# Patient Record
Sex: MALE | Race: WHITE | NOT HISPANIC OR LATINO | URBAN - METROPOLITAN AREA
[De-identification: names, ages, dates, MRNs, and addresses within clinical notes are randomized per-mention and may not be internally consistent; named-entity substitution may affect disease eponyms.]

---

## 2017-03-29 ENCOUNTER — OUTPATIENT (OUTPATIENT)
Dept: OUTPATIENT SERVICES | Facility: HOSPITAL | Age: 47
LOS: 1 days | End: 2017-03-29
Payer: COMMERCIAL

## 2017-03-29 DIAGNOSIS — M54.16 RADICULOPATHY, LUMBAR REGION: ICD-10-CM

## 2017-03-29 PROCEDURE — 77003 FLUOROGUIDE FOR SPINE INJECT: CPT

## 2017-03-29 PROCEDURE — 62323 NJX INTERLAMINAR LMBR/SAC: CPT

## 2017-04-06 ENCOUNTER — TRANSCRIPTION ENCOUNTER (OUTPATIENT)
Age: 47
End: 2017-04-06

## 2017-08-02 ENCOUNTER — OUTPATIENT (OUTPATIENT)
Dept: OUTPATIENT SERVICES | Facility: HOSPITAL | Age: 47
LOS: 1 days | End: 2017-08-02
Payer: COMMERCIAL

## 2017-08-02 DIAGNOSIS — M54.16 RADICULOPATHY, LUMBAR REGION: ICD-10-CM

## 2017-08-02 PROCEDURE — 77003 FLUOROGUIDE FOR SPINE INJECT: CPT

## 2017-08-02 PROCEDURE — 62323 NJX INTERLAMINAR LMBR/SAC: CPT

## 2017-10-15 ENCOUNTER — TRANSCRIPTION ENCOUNTER (OUTPATIENT)
Age: 47
End: 2017-10-15

## 2018-02-07 ENCOUNTER — APPOINTMENT (OUTPATIENT)
Dept: HEMATOLOGY ONCOLOGY | Facility: CLINIC | Age: 48
End: 2018-02-07
Payer: COMMERCIAL

## 2018-02-07 VITALS
TEMPERATURE: 98.5 F | DIASTOLIC BLOOD PRESSURE: 60 MMHG | HEIGHT: 68 IN | WEIGHT: 222 LBS | BODY MASS INDEX: 33.65 KG/M2 | SYSTOLIC BLOOD PRESSURE: 120 MMHG | HEART RATE: 72 BPM | RESPIRATION RATE: 16 BRPM

## 2018-02-07 DIAGNOSIS — E78.00 PURE HYPERCHOLESTEROLEMIA, UNSPECIFIED: ICD-10-CM

## 2018-02-07 DIAGNOSIS — F17.200 NICOTINE DEPENDENCE, UNSPECIFIED, UNCOMPLICATED: ICD-10-CM

## 2018-02-07 DIAGNOSIS — Z87.19 PERSONAL HISTORY OF OTHER DISEASES OF THE DIGESTIVE SYSTEM: ICD-10-CM

## 2018-02-07 DIAGNOSIS — Z78.9 OTHER SPECIFIED HEALTH STATUS: ICD-10-CM

## 2018-02-07 DIAGNOSIS — D75.1 SECONDARY POLYCYTHEMIA: ICD-10-CM

## 2018-02-07 PROCEDURE — 99205 OFFICE O/P NEW HI 60 MIN: CPT

## 2018-02-07 RX ORDER — PIROXICAM 20 MG/1
20 CAPSULE ORAL
Qty: 30 | Refills: 0 | Status: ACTIVE | COMMUNITY
Start: 2018-01-16

## 2018-02-07 RX ORDER — SODIUM PICOSULFATE, MAGNESIUM OXIDE, AND ANHYDROUS CITRIC ACID 10; 3.5; 12 MG/16.2G; G/16.2G; G/16.2G
10-3.5-12 POWDER, METERED ORAL
Qty: 2 | Refills: 0 | Status: COMPLETED | COMMUNITY
Start: 2018-01-19

## 2018-02-07 RX ORDER — FLUTICASONE PROPIONATE AND SALMETEROL 50; 100 UG/1; UG/1
100-50 POWDER RESPIRATORY (INHALATION)
Qty: 60 | Refills: 0 | Status: ACTIVE | COMMUNITY
Start: 2017-10-15

## 2018-08-22 ENCOUNTER — OUTPATIENT (OUTPATIENT)
Dept: OUTPATIENT SERVICES | Facility: HOSPITAL | Age: 48
LOS: 1 days | End: 2018-08-22
Payer: COMMERCIAL

## 2018-08-22 DIAGNOSIS — M54.16 RADICULOPATHY, LUMBAR REGION: ICD-10-CM

## 2018-08-22 PROCEDURE — 77003 FLUOROGUIDE FOR SPINE INJECT: CPT

## 2018-08-22 PROCEDURE — 62323 NJX INTERLAMINAR LMBR/SAC: CPT

## 2018-08-29 ENCOUNTER — APPOINTMENT (OUTPATIENT)
Dept: CARDIOLOGY | Facility: CLINIC | Age: 48
End: 2018-08-29
Payer: COMMERCIAL

## 2018-08-29 ENCOUNTER — NON-APPOINTMENT (OUTPATIENT)
Age: 48
End: 2018-08-29

## 2018-08-29 VITALS
WEIGHT: 215 LBS | HEIGHT: 68 IN | HEART RATE: 70 BPM | OXYGEN SATURATION: 95 % | RESPIRATION RATE: 17 BRPM | SYSTOLIC BLOOD PRESSURE: 128 MMHG | BODY MASS INDEX: 32.58 KG/M2 | DIASTOLIC BLOOD PRESSURE: 83 MMHG

## 2018-08-29 DIAGNOSIS — I25.10 ATHEROSCLEROTIC HEART DISEASE OF NATIVE CORONARY ARTERY W/OUT ANGINA PECTORIS: ICD-10-CM

## 2018-08-29 DIAGNOSIS — I25.84 ATHEROSCLEROTIC HEART DISEASE OF NATIVE CORONARY ARTERY W/OUT ANGINA PECTORIS: ICD-10-CM

## 2018-08-29 DIAGNOSIS — Z82.49 FAMILY HISTORY OF ISCHEMIC HEART DISEASE AND OTHER DISEASES OF THE CIRCULATORY SYSTEM: ICD-10-CM

## 2018-08-29 DIAGNOSIS — Z86.39 PERSONAL HISTORY OF OTHER ENDOCRINE, NUTRITIONAL AND METABOLIC DISEASE: ICD-10-CM

## 2018-08-29 DIAGNOSIS — R00.2 PALPITATIONS: ICD-10-CM

## 2018-08-29 PROCEDURE — 93306 TTE W/DOPPLER COMPLETE: CPT

## 2018-08-29 PROCEDURE — 93270 REMOTE 30 DAY ECG REV/REPORT: CPT

## 2018-08-29 PROCEDURE — 93000 ELECTROCARDIOGRAM COMPLETE: CPT | Mod: 59

## 2018-08-29 PROCEDURE — 99244 OFF/OP CNSLTJ NEW/EST MOD 40: CPT | Mod: 25

## 2018-10-10 ENCOUNTER — APPOINTMENT (OUTPATIENT)
Dept: CARDIOLOGY | Facility: CLINIC | Age: 48
End: 2018-10-10

## 2018-10-10 PROBLEM — I25.10 CORONARY ARTERY CALCIFICATION: Status: ACTIVE | Noted: 2018-10-10

## 2018-10-22 ENCOUNTER — APPOINTMENT (OUTPATIENT)
Dept: CARDIOLOGY | Facility: CLINIC | Age: 48
End: 2018-10-22

## 2018-10-24 ENCOUNTER — OUTPATIENT (OUTPATIENT)
Dept: OUTPATIENT SERVICES | Facility: HOSPITAL | Age: 48
LOS: 1 days | End: 2018-10-24
Payer: COMMERCIAL

## 2018-10-24 DIAGNOSIS — M54.16 RADICULOPATHY, LUMBAR REGION: ICD-10-CM

## 2018-10-24 PROCEDURE — 77003 FLUOROGUIDE FOR SPINE INJECT: CPT

## 2018-10-24 PROCEDURE — 62323 NJX INTERLAMINAR LMBR/SAC: CPT

## 2018-10-29 ENCOUNTER — APPOINTMENT (OUTPATIENT)
Dept: CARDIOLOGY | Facility: CLINIC | Age: 48
End: 2018-10-29
Payer: COMMERCIAL

## 2018-10-29 ENCOUNTER — APPOINTMENT (OUTPATIENT)
Dept: CARDIOLOGY | Facility: CLINIC | Age: 48
End: 2018-10-29

## 2018-10-29 VITALS — DIASTOLIC BLOOD PRESSURE: 60 MMHG | SYSTOLIC BLOOD PRESSURE: 100 MMHG | HEART RATE: 115 BPM

## 2018-10-29 PROCEDURE — 99214 OFFICE O/P EST MOD 30 MIN: CPT

## 2018-10-29 PROCEDURE — 93000 ELECTROCARDIOGRAM COMPLETE: CPT

## 2018-10-29 PROCEDURE — ZZZZZ: CPT

## 2018-10-31 ENCOUNTER — NON-APPOINTMENT (OUTPATIENT)
Age: 48
End: 2018-10-31

## 2018-10-31 PROCEDURE — 93224 XTRNL ECG REC UP TO 48 HRS: CPT

## 2018-11-01 ENCOUNTER — APPOINTMENT (OUTPATIENT)
Dept: CARDIOLOGY | Facility: CLINIC | Age: 48
End: 2018-11-01
Payer: COMMERCIAL

## 2018-11-01 DIAGNOSIS — R93.1 ABNORMAL FINDINGS ON DIAGNOSTIC IMAGING OF HEART AND CORONARY CIRCULATION: ICD-10-CM

## 2018-11-01 PROCEDURE — 93015 CV STRESS TEST SUPVJ I&R: CPT

## 2018-11-01 PROCEDURE — A9500: CPT

## 2018-11-01 PROCEDURE — 78452 HT MUSCLE IMAGE SPECT MULT: CPT

## 2018-11-01 PROCEDURE — 93272 ECG/REVIEW INTERPRET ONLY: CPT

## 2018-11-01 RX ORDER — EZETIMIBE 10 MG/1
10 TABLET ORAL
Qty: 90 | Refills: 0 | Status: DISCONTINUED | COMMUNITY
Start: 2017-09-29 | End: 2018-11-01

## 2018-11-02 PROBLEM — R93.1 ABNORMAL NUCLEAR CARDIAC IMAGING TEST: Status: ACTIVE | Noted: 2018-11-02

## 2018-11-14 ENCOUNTER — APPOINTMENT (OUTPATIENT)
Dept: CARDIOLOGY | Facility: CLINIC | Age: 48
End: 2018-11-14
Payer: COMMERCIAL

## 2018-11-14 ENCOUNTER — NON-APPOINTMENT (OUTPATIENT)
Age: 48
End: 2018-11-14

## 2018-11-14 VITALS
BODY MASS INDEX: 31.83 KG/M2 | HEART RATE: 62 BPM | WEIGHT: 210 LBS | DIASTOLIC BLOOD PRESSURE: 91 MMHG | SYSTOLIC BLOOD PRESSURE: 146 MMHG | OXYGEN SATURATION: 96 % | RESPIRATION RATE: 16 BRPM | HEIGHT: 68 IN

## 2018-11-14 VITALS — SYSTOLIC BLOOD PRESSURE: 144 MMHG | DIASTOLIC BLOOD PRESSURE: 82 MMHG

## 2018-11-14 PROCEDURE — 99215 OFFICE O/P EST HI 40 MIN: CPT

## 2018-11-14 PROCEDURE — 93000 ELECTROCARDIOGRAM COMPLETE: CPT

## 2018-11-14 NOTE — HISTORY OF PRESENT ILLNESS
[FreeTextEntry1] : Referring Physician: Tonny Emmanuel MD\par \par Dear Tonny:\par \par Mr. Mihir Munoz was seen in the [facility] today. 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 He presents today for evaluation of new onset atrial fibrillation. He first noted onset of palpitations last spring when he was away on vacation. At the end of the summer he had an asthma exacerbation and was using an albuterol inhaler. He again had symptoms and was evaluated in your office. By the time he was seen symptoms had resolved. A 30 day monitor showed no arrhythmia. He again had symptoms and came in for stress testing and was found to be in rapid atrial fibrillation, which again spontaneously terminated within 24 hours.\par \par Stress testing was performed which showed no ischemia but TID and moderate post-stress LV dysfunction.\par \par Mr. uMnoz denies any recent history of chest pain, shortness of breath, palpitations, dizziness, or syncope. He works as a  but denies any history of steroid/supplement use. His father  of an MI at age 53 but reportedly was a heavy smoker with several uncontrolled risk factors.\par \par

## 2018-11-14 NOTE — DISCUSSION/SUMMARY
[FreeTextEntry1] : In summary, this is a 48 year old man with new onset symptomatic paroxysmal atrial fibrillation. Episodes may be correlated with asthma exacerbation and increased use of an albuterol inhaler, but are also occurring in the context of a possible ischemic syndrome. Recent stress testing and inferior wall ECG abnormalities are concerning for the possibility of underlying obstructive CAD. I advised Mr. Munoz to undergo left heart catheterization.  \par \par If recent findings were false positives and he has no structural disease, his CHADSVASC score is 0 and no anti-coagulation would be recommended. However, if indded he is found to have underlying disease he would ultimately be advised to begin stroke prophylaxis. \par \par Options regarding management, including a rate control strategy with AV missy blocking agents, or rhythm control strategies employing anti-arrhythmic drugs and/or catheter ablation were reviewed. Given the infrequency of symptoms, short duration, and his young age, a pill-in-pocket strategy may be most effective strategy for him. I also advised that he switch to a beta-selective inhaler and pursue sleep study. Should symptoms increase in frequency despite these measures, ablation might be a reasonable next strategy.\par \par Mr. Munoz appeared to understand the whole discussion and verbalized that all of his questions were answered to his satisfaction. He will follow up in 3 months.\par \par Thank you for allowing me to be involved in the care of this pleasant man. Please feel free to contact me with any questions.

## 2018-11-14 NOTE — PHYSICAL EXAM
[General Appearance - Well Developed] : well developed [Normal Appearance] : normal appearance [Well Groomed] : well groomed [General Appearance - Well Nourished] : well nourished [No Deformities] : no deformities [General Appearance - In No Acute Distress] : no acute distress [Normal Jugular Venous A Waves Present] : normal jugular venous A waves present [Normal Jugular Venous V Waves Present] : normal jugular venous V waves present [No Jugular Venous Bradford A Waves] : no jugular venous bradford A waves [Heart Rate And Rhythm] : heart rate and rhythm were normal [Heart Sounds] : normal S1 and S2 [Murmurs] : no murmurs present [Respiration, Rhythm And Depth] : normal respiratory rhythm and effort [Exaggerated Use Of Accessory Muscles For Inspiration] : no accessory muscle use [Auscultation Breath Sounds / Voice Sounds] : lungs were clear to auscultation bilaterally [Abdomen Soft] : soft [Abdomen Tenderness] : non-tender [Abdomen Mass (___ Cm)] : no abdominal mass palpated [Nail Clubbing] : no clubbing of the fingernails [Cyanosis, Localized] : no localized cyanosis [Petechial Hemorrhages (___cm)] : no petechial hemorrhages [] : no ischemic changes

## 2018-11-16 ENCOUNTER — OUTPATIENT (OUTPATIENT)
Dept: OUTPATIENT SERVICES | Facility: HOSPITAL | Age: 48
LOS: 1 days | End: 2018-11-16
Payer: COMMERCIAL

## 2018-11-16 VITALS
RESPIRATION RATE: 16 BRPM | SYSTOLIC BLOOD PRESSURE: 133 MMHG | WEIGHT: 210.1 LBS | HEIGHT: 69 IN | TEMPERATURE: 98 F | HEART RATE: 60 BPM | OXYGEN SATURATION: 98 % | DIASTOLIC BLOOD PRESSURE: 83 MMHG

## 2018-11-16 DIAGNOSIS — Z98.890 OTHER SPECIFIED POSTPROCEDURAL STATES: Chronic | ICD-10-CM

## 2018-11-16 DIAGNOSIS — R94.39 ABNORMAL RESULT OF OTHER CARDIOVASCULAR FUNCTION STUDY: ICD-10-CM

## 2018-11-16 DIAGNOSIS — M67.00 SHORT ACHILLES TENDON (ACQUIRED), UNSPECIFIED ANKLE: Chronic | ICD-10-CM

## 2018-11-16 LAB
ALBUMIN SERPL ELPH-MCNC: 4.9 G/DL — SIGNIFICANT CHANGE UP (ref 3.3–5)
ALP SERPL-CCNC: 54 U/L — SIGNIFICANT CHANGE UP (ref 40–120)
ALT FLD-CCNC: 62 U/L — HIGH (ref 10–45)
ANION GAP SERPL CALC-SCNC: 14 MMOL/L — SIGNIFICANT CHANGE UP (ref 5–17)
AST SERPL-CCNC: 51 U/L — HIGH (ref 10–40)
BILIRUB SERPL-MCNC: 0.6 MG/DL — SIGNIFICANT CHANGE UP (ref 0.2–1.2)
BUN SERPL-MCNC: 20 MG/DL — SIGNIFICANT CHANGE UP (ref 7–23)
CALCIUM SERPL-MCNC: 9.7 MG/DL — SIGNIFICANT CHANGE UP (ref 8.4–10.5)
CHLORIDE SERPL-SCNC: 101 MMOL/L — SIGNIFICANT CHANGE UP (ref 96–108)
CO2 SERPL-SCNC: 24 MMOL/L — SIGNIFICANT CHANGE UP (ref 22–31)
CREAT SERPL-MCNC: 1.26 MG/DL — SIGNIFICANT CHANGE UP (ref 0.5–1.3)
GLUCOSE SERPL-MCNC: 142 MG/DL — HIGH (ref 70–99)
HCT VFR BLD CALC: 52.4 % — HIGH (ref 39–50)
HGB BLD-MCNC: 17.4 G/DL — HIGH (ref 13–17)
MCHC RBC-ENTMCNC: 30.7 PG — SIGNIFICANT CHANGE UP (ref 27–34)
MCHC RBC-ENTMCNC: 33.3 GM/DL — SIGNIFICANT CHANGE UP (ref 32–36)
MCV RBC AUTO: 92.2 FL — SIGNIFICANT CHANGE UP (ref 80–100)
PLATELET # BLD AUTO: 172 K/UL — SIGNIFICANT CHANGE UP (ref 150–400)
POTASSIUM SERPL-MCNC: 4.3 MMOL/L — SIGNIFICANT CHANGE UP (ref 3.5–5.3)
POTASSIUM SERPL-SCNC: 4.3 MMOL/L — SIGNIFICANT CHANGE UP (ref 3.5–5.3)
PROT SERPL-MCNC: 7.4 G/DL — SIGNIFICANT CHANGE UP (ref 6–8.3)
RBC # BLD: 5.68 M/UL — SIGNIFICANT CHANGE UP (ref 4.2–5.8)
RBC # FLD: 12.9 % — SIGNIFICANT CHANGE UP (ref 10.3–14.5)
SODIUM SERPL-SCNC: 139 MMOL/L — SIGNIFICANT CHANGE UP (ref 135–145)
WBC # BLD: 5.5 K/UL — SIGNIFICANT CHANGE UP (ref 3.8–10.5)
WBC # FLD AUTO: 5.5 K/UL — SIGNIFICANT CHANGE UP (ref 3.8–10.5)

## 2018-11-16 PROCEDURE — 93010 ELECTROCARDIOGRAM REPORT: CPT

## 2018-11-16 PROCEDURE — 99203 OFFICE O/P NEW LOW 30 MIN: CPT

## 2018-11-16 PROCEDURE — 99152 MOD SED SAME PHYS/QHP 5/>YRS: CPT | Mod: GC

## 2018-11-16 PROCEDURE — C1769: CPT

## 2018-11-16 PROCEDURE — C1887: CPT

## 2018-11-16 PROCEDURE — 99152 MOD SED SAME PHYS/QHP 5/>YRS: CPT

## 2018-11-16 PROCEDURE — C1894: CPT

## 2018-11-16 PROCEDURE — 93458 L HRT ARTERY/VENTRICLE ANGIO: CPT

## 2018-11-16 PROCEDURE — 93005 ELECTROCARDIOGRAM TRACING: CPT

## 2018-11-16 PROCEDURE — 85027 COMPLETE CBC AUTOMATED: CPT

## 2018-11-16 PROCEDURE — 93458 L HRT ARTERY/VENTRICLE ANGIO: CPT | Mod: 26,GC

## 2018-11-16 PROCEDURE — 80053 COMPREHEN METABOLIC PANEL: CPT

## 2018-11-16 RX ORDER — DIPHENHYDRAMINE HCL 50 MG
50 CAPSULE ORAL ONCE
Qty: 0 | Refills: 0 | Status: DISCONTINUED | OUTPATIENT
Start: 2018-11-16 | End: 2018-12-01

## 2018-11-16 NOTE — H&P CARDIOLOGY - HISTORY OF PRESENT ILLNESS
This is a 49 yo  male with PMH of HLD new dx pAFib ( will see  Dr Monae as outpatient for further eval after LHC, not on A/C currenlty ), and controlled Asthma, denies any further significant PMH however reports FH significant for CAD, father  from MI at age 54.  Presents to Dr Newton with c/c of "palpitations" after using his albuterol, seen and evaluated, found to have "abnormal EKG", sent to [facility] last week which was abnormal ( as per patient, no official report available).  Referred here today for cardiac cath.  Presently asymptomatic.

## 2018-11-20 ENCOUNTER — APPOINTMENT (OUTPATIENT)
Dept: CARDIOLOGY | Facility: CLINIC | Age: 48
End: 2018-11-20

## 2018-12-03 PROBLEM — E78.5 HYPERLIPIDEMIA, UNSPECIFIED: Chronic | Status: ACTIVE | Noted: 2018-11-16

## 2018-12-03 PROBLEM — J45.909 UNSPECIFIED ASTHMA, UNCOMPLICATED: Chronic | Status: ACTIVE | Noted: 2018-11-16

## 2018-12-03 PROBLEM — I48.0 PAROXYSMAL ATRIAL FIBRILLATION: Chronic | Status: ACTIVE | Noted: 2018-11-16

## 2018-12-05 ENCOUNTER — APPOINTMENT (OUTPATIENT)
Dept: CARDIOLOGY | Facility: CLINIC | Age: 48
End: 2018-12-05

## 2018-12-12 ENCOUNTER — NON-APPOINTMENT (OUTPATIENT)
Age: 48
End: 2018-12-12

## 2018-12-12 ENCOUNTER — APPOINTMENT (OUTPATIENT)
Dept: CARDIOLOGY | Facility: CLINIC | Age: 48
End: 2018-12-12
Payer: COMMERCIAL

## 2018-12-12 VITALS
BODY MASS INDEX: 31.98 KG/M2 | WEIGHT: 211 LBS | OXYGEN SATURATION: 96 % | HEIGHT: 68 IN | HEART RATE: 60 BPM | DIASTOLIC BLOOD PRESSURE: 83 MMHG | RESPIRATION RATE: 15 BRPM | SYSTOLIC BLOOD PRESSURE: 121 MMHG

## 2018-12-12 PROCEDURE — 99214 OFFICE O/P EST MOD 30 MIN: CPT

## 2018-12-12 PROCEDURE — 93000 ELECTROCARDIOGRAM COMPLETE: CPT

## 2018-12-12 NOTE — DISCUSSION/SUMMARY
[FreeTextEntry1] : In summary, this is a 48 year old man with new onset symptomatic paroxysmal atrial fibrillation. Episodes may be correlated with asthma exacerbation and increased use of an albuterol inhaler. He also provides history highly suspicious for underlying sleep apnea, which may be triggering his episodes. I recommended follow up with his pulmonologist for transition to a beta-2 selective inhaler and referral for sleep study.\par \par Options regarding management, including a rate control strategy with AV missy blocking agents, or rhythm control strategies employing anti-arrhythmic drugs and/or catheter ablation were reviewed. Given the infrequency of symptoms, short duration, and his young age, a pill-in-pocket strategy may be most effective strategy for him. We discussed ablation should symptoms increase in frequency/duration over the coming years. We also discussed further rhythm monitoring going forward via the [employer] bang.\par \par Mr. Munoz appeared to understand the whole discussion and verbalized that all of his questions were answered to his satisfaction. He will follow up in 3 months.\par \par Thank you for allowing me to be involved in the care of this pleasant man. Please feel free to contact me with any questions.

## 2018-12-12 NOTE — HISTORY OF PRESENT ILLNESS
[FreeTextEntry1] : Referring Physician: Tonny Emmanuel MD\par \par Dear Tonny:\par \par Mr. Mihir Munoz was seen in the [facility] today. 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 He presents today for evaluation of new onset atrial fibrillation. He first noted onset of palpitations last spring when he was away on vacation. At the end of the summer he had an asthma exacerbation and was using an albuterol inhaler. He again had symptoms and was evaluated in your office. By the time he was seen symptoms had resolved. A 30 day monitor showed no arrhythmia. He again had symptoms and came in for stress testing and was found to be in rapid atrial fibrillation, which again spontaneously terminated within 24 hours.\par \par Stress testing was performed which showed no ischemia but TID and moderate post-stress LV dysfunction. Follow up [facility] showed no CAD with normal ventriculogram and normal TTE. \par \par Over the past month he has had no recurrent symptoms. Mr. Munoz denies any recent history of chest pain, shortness of breath, palpitations, dizziness, or syncope. He works as a  but denies any history of steroid/supplement use. On today's evaluation he notes his wife "always thinks I'm dying" during sleep due to long periods of apnea. \par

## 2018-12-12 NOTE — PHYSICAL EXAM
[General Appearance - Well Developed] : well developed [Normal Appearance] : normal appearance [Well Groomed] : well groomed [General Appearance - Well Nourished] : well nourished [No Deformities] : no deformities [General Appearance - In No Acute Distress] : no acute distress [Normal Jugular Venous A Waves Present] : normal jugular venous A waves present [Normal Jugular Venous V Waves Present] : normal jugular venous V waves present [No Jugular Venous Bradford A Waves] : no jugular venous bradford A waves [Respiration, Rhythm And Depth] : normal respiratory rhythm and effort [Exaggerated Use Of Accessory Muscles For Inspiration] : no accessory muscle use [Auscultation Breath Sounds / Voice Sounds] : lungs were clear to auscultation bilaterally [Heart Rate And Rhythm] : heart rate and rhythm were normal [Heart Sounds] : normal S1 and S2 [Murmurs] : no murmurs present [Abdomen Soft] : soft [Abdomen Tenderness] : non-tender [Abdomen Mass (___ Cm)] : no abdominal mass palpated [Nail Clubbing] : no clubbing of the fingernails [Cyanosis, Localized] : no localized cyanosis [Petechial Hemorrhages (___cm)] : no petechial hemorrhages [] : no ischemic changes

## 2019-01-08 ENCOUNTER — TRANSCRIPTION ENCOUNTER (OUTPATIENT)
Age: 49
End: 2019-01-08

## 2019-01-25 ENCOUNTER — TRANSCRIPTION ENCOUNTER (OUTPATIENT)
Age: 49
End: 2019-01-25

## 2019-02-13 ENCOUNTER — APPOINTMENT (OUTPATIENT)
Dept: CARDIOLOGY | Facility: CLINIC | Age: 49
End: 2019-02-13

## 2019-03-25 ENCOUNTER — RX RENEWAL (OUTPATIENT)
Age: 49
End: 2019-03-25

## 2019-06-05 ENCOUNTER — APPOINTMENT (OUTPATIENT)
Dept: ELECTROPHYSIOLOGY | Facility: CLINIC | Age: 49
End: 2019-06-05
Payer: COMMERCIAL

## 2019-06-05 VITALS
SYSTOLIC BLOOD PRESSURE: 126 MMHG | DIASTOLIC BLOOD PRESSURE: 82 MMHG | WEIGHT: 215 LBS | BODY MASS INDEX: 32.58 KG/M2 | OXYGEN SATURATION: 95 % | HEART RATE: 77 BPM | HEIGHT: 68 IN

## 2019-06-05 PROCEDURE — 99214 OFFICE O/P EST MOD 30 MIN: CPT

## 2019-06-05 PROCEDURE — 93000 ELECTROCARDIOGRAM COMPLETE: CPT

## 2019-06-05 RX ORDER — IBUPROFEN 600 MG/1
600 TABLET, FILM COATED ORAL
Qty: 20 | Refills: 0 | Status: DISCONTINUED | COMMUNITY
Start: 2017-11-21 | End: 2019-06-05

## 2019-06-05 RX ORDER — ALBUTEROL SULFATE 90 UG/1
108 (90 BASE) AEROSOL, METERED RESPIRATORY (INHALATION)
Qty: 8 | Refills: 0 | Status: DISCONTINUED | COMMUNITY
Start: 2017-10-15 | End: 2019-06-05

## 2019-06-05 RX ORDER — EZETIMIBE 10 MG/1
10 TABLET ORAL DAILY
Qty: 30 | Refills: 3 | Status: ACTIVE | COMMUNITY

## 2019-06-05 RX ORDER — PREDNISONE 50 MG/1
50 TABLET ORAL
Qty: 3 | Refills: 0 | Status: DISCONTINUED | COMMUNITY
Start: 2018-11-14 | End: 2019-06-05

## 2019-06-05 NOTE — HISTORY OF PRESENT ILLNESS
[FreeTextEntry1] : Referring Physician: Tonny Emmanuel MD\par \par Dear Tonny:\par \par Mr. Mihir Munoz was seen in the [facility]rophysiology [facility] today. 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 He presents today for evaluation of new onset atrial fibrillation. He first noted onset of palpitations last spring when he was away on vacation. At the end of the summer he had an asthma exacerbation and was using an albuterol inhaler. He again had symptoms and was evaluated in your office. By the time he was seen symptoms had resolved. A 30 day monitor showed no arrhythmia. He again had symptoms and came in for stress testing and was found to be in rapid atrial fibrillation, which again spontaneously terminated within 24 hours.\par \par Stress testing was performed which showed no ischemia but TID and moderate post-stress LV dysfunction. Follow up [facility] showed no CAD with normal ventriculogram and normal TTE. \par \par He presents today after recurrent onset of atrial fibrillation 3 days ago, which he recorded on his Apple Watch. He communicated with you and was restarted on Xarelto. He noted termination of the arrhythmia overnight and was in sinus when he awoke.\par

## 2019-06-05 NOTE — DISCUSSION/SUMMARY
[FreeTextEntry1] : In summary, this is a 48 year old man with recurrent symptomatic paroxysmal atrial fibrillation. \par \par Options regarding management, including a rate control strategy with AV missy blocking agents, or rhythm control strategies employing anti-arrhythmic drugs and/or catheter ablation were reviewed. Specifically we discussed options of a "pill-in-pocket" approach using PRN Flecainide versus returning for an ablation procedure. Given his age and level of physical activity he is reluctant to go the medication route and was in favor of returning for an ablation procedure. He would like to wait for the fall when work slows down. He will return in August for follow up.\par \par The rationale for the procedure as well as its risks--including but not limited to bleeding, vascular injury, pericardial effusion/tamponade, heart block requiring pacemaker, stroke, and death--were reviewed in detail. After consideration of this information, the decision was made to proceed with the procedure.\par \par Mr. Munoz appeared to understand the whole discussion and verbalized that all of his questions were answered to his satisfaction. \par \par Thank you for allowing me to be involved in the care of this pleasant man. Please feel free to contact me with any questions.

## 2019-06-05 NOTE — PHYSICAL EXAM
[General Appearance - Well Developed] : well developed [Normal Appearance] : normal appearance [Well Groomed] : well groomed [General Appearance - Well Nourished] : well nourished [No Deformities] : no deformities [General Appearance - In No Acute Distress] : no acute distress [Eyelids - No Xanthelasma] : the eyelids demonstrated no xanthelasmas [Normal Conjunctiva] : the conjunctiva exhibited no abnormalities [Normal Oral Mucosa] : normal oral mucosa [No Oral Pallor] : no oral pallor [No Oral Cyanosis] : no oral cyanosis [Normal Jugular Venous A Waves Present] : normal jugular venous A waves present [Normal Jugular Venous V Waves Present] : normal jugular venous V waves present [No Jugular Venous Bradford A Waves] : no jugular venous bradford A waves [Respiration, Rhythm And Depth] : normal respiratory rhythm and effort [Exaggerated Use Of Accessory Muscles For Inspiration] : no accessory muscle use [Heart Rate And Rhythm] : heart rate and rhythm were normal [Auscultation Breath Sounds / Voice Sounds] : lungs were clear to auscultation bilaterally [Murmurs] : no murmurs present [Heart Sounds] : normal S1 and S2 [Abdomen Soft] : soft [Abdomen Tenderness] : non-tender [Abdomen Mass (___ Cm)] : no abdominal mass palpated [Gait - Sufficient For Exercise Testing] : the gait was sufficient for exercise testing [Abnormal Walk] : normal gait [Nail Clubbing] : no clubbing of the fingernails [Cyanosis, Localized] : no localized cyanosis [Petechial Hemorrhages (___cm)] : no petechial hemorrhages [] : no rash [Skin Color & Pigmentation] : normal skin color and pigmentation [Skin Lesions] : no skin lesions [No Venous Stasis] : no venous stasis [No Skin Ulcers] : no skin ulcer [No Xanthoma] : no  xanthoma was observed [Oriented To Time, Place, And Person] : oriented to person, place, and time [Affect] : the affect was normal [Mood] : the mood was normal [No Anxiety] : not feeling anxious

## 2019-09-20 ENCOUNTER — TRANSCRIPTION ENCOUNTER (OUTPATIENT)
Age: 49
End: 2019-09-20

## 2019-12-04 ENCOUNTER — OUTPATIENT (OUTPATIENT)
Dept: OUTPATIENT SERVICES | Facility: HOSPITAL | Age: 49
LOS: 1 days | End: 2019-12-04
Payer: COMMERCIAL

## 2019-12-04 DIAGNOSIS — M67.00 SHORT ACHILLES TENDON (ACQUIRED), UNSPECIFIED ANKLE: Chronic | ICD-10-CM

## 2019-12-04 DIAGNOSIS — Z98.890 OTHER SPECIFIED POSTPROCEDURAL STATES: Chronic | ICD-10-CM

## 2019-12-04 DIAGNOSIS — M54.16 RADICULOPATHY, LUMBAR REGION: ICD-10-CM

## 2019-12-04 PROCEDURE — 62323 NJX INTERLAMINAR LMBR/SAC: CPT

## 2019-12-04 PROCEDURE — 77003 FLUOROGUIDE FOR SPINE INJECT: CPT

## 2020-02-06 ENCOUNTER — TRANSCRIPTION ENCOUNTER (OUTPATIENT)
Age: 50
End: 2020-02-06

## 2020-03-13 ENCOUNTER — RX RENEWAL (OUTPATIENT)
Age: 50
End: 2020-03-13

## 2020-05-06 ENCOUNTER — TRANSCRIPTION ENCOUNTER (OUTPATIENT)
Age: 50
End: 2020-05-06

## 2020-08-09 ENCOUNTER — OUTPATIENT (OUTPATIENT)
Dept: OUTPATIENT SERVICES | Facility: HOSPITAL | Age: 50
LOS: 1 days | End: 2020-08-09
Payer: COMMERCIAL

## 2020-08-09 DIAGNOSIS — M67.00 SHORT ACHILLES TENDON (ACQUIRED), UNSPECIFIED ANKLE: Chronic | ICD-10-CM

## 2020-08-09 DIAGNOSIS — Z98.890 OTHER SPECIFIED POSTPROCEDURAL STATES: Chronic | ICD-10-CM

## 2020-08-09 DIAGNOSIS — Z11.59 ENCOUNTER FOR SCREENING FOR OTHER VIRAL DISEASES: ICD-10-CM

## 2020-08-09 LAB — SARS-COV-2 RNA SPEC QL NAA+PROBE: SIGNIFICANT CHANGE UP

## 2020-08-09 PROCEDURE — U0003: CPT

## 2020-08-11 ENCOUNTER — OUTPATIENT (OUTPATIENT)
Dept: OUTPATIENT SERVICES | Facility: HOSPITAL | Age: 50
LOS: 1 days | End: 2020-08-11
Payer: COMMERCIAL

## 2020-08-11 DIAGNOSIS — M67.00 SHORT ACHILLES TENDON (ACQUIRED), UNSPECIFIED ANKLE: Chronic | ICD-10-CM

## 2020-08-11 DIAGNOSIS — Z98.890 OTHER SPECIFIED POSTPROCEDURAL STATES: Chronic | ICD-10-CM

## 2020-08-11 DIAGNOSIS — M54.16 RADICULOPATHY, LUMBAR REGION: ICD-10-CM

## 2020-08-11 PROCEDURE — 77003 FLUOROGUIDE FOR SPINE INJECT: CPT

## 2020-08-11 PROCEDURE — 62323 NJX INTERLAMINAR LMBR/SAC: CPT

## 2020-12-03 ENCOUNTER — TRANSCRIPTION ENCOUNTER (OUTPATIENT)
Age: 50
End: 2020-12-03

## 2020-12-18 ENCOUNTER — RX RENEWAL (OUTPATIENT)
Age: 50
End: 2020-12-18

## 2021-01-12 ENCOUNTER — OUTPATIENT (OUTPATIENT)
Dept: OUTPATIENT SERVICES | Facility: HOSPITAL | Age: 51
LOS: 1 days | End: 2021-01-12
Payer: COMMERCIAL

## 2021-01-12 DIAGNOSIS — Z20.828 CONTACT WITH AND (SUSPECTED) EXPOSURE TO OTHER VIRAL COMMUNICABLE DISEASES: ICD-10-CM

## 2021-01-12 DIAGNOSIS — Z98.890 OTHER SPECIFIED POSTPROCEDURAL STATES: Chronic | ICD-10-CM

## 2021-01-12 DIAGNOSIS — M67.00 SHORT ACHILLES TENDON (ACQUIRED), UNSPECIFIED ANKLE: Chronic | ICD-10-CM

## 2021-01-12 LAB — SARS-COV-2 RNA SPEC QL NAA+PROBE: DETECTED

## 2021-01-12 PROCEDURE — U0003: CPT

## 2021-01-12 PROCEDURE — U0005: CPT

## 2021-01-31 ENCOUNTER — RX RENEWAL (OUTPATIENT)
Age: 51
End: 2021-01-31

## 2021-02-02 ENCOUNTER — OUTPATIENT (OUTPATIENT)
Dept: OUTPATIENT SERVICES | Facility: HOSPITAL | Age: 51
LOS: 1 days | End: 2021-02-02
Payer: COMMERCIAL

## 2021-02-02 DIAGNOSIS — Z98.890 OTHER SPECIFIED POSTPROCEDURAL STATES: Chronic | ICD-10-CM

## 2021-02-02 DIAGNOSIS — Z20.828 CONTACT WITH AND (SUSPECTED) EXPOSURE TO OTHER VIRAL COMMUNICABLE DISEASES: ICD-10-CM

## 2021-02-02 DIAGNOSIS — M67.00 SHORT ACHILLES TENDON (ACQUIRED), UNSPECIFIED ANKLE: Chronic | ICD-10-CM

## 2021-02-02 LAB — SARS-COV-2 RNA SPEC QL NAA+PROBE: DETECTED

## 2021-02-02 PROCEDURE — U0003: CPT

## 2021-02-02 PROCEDURE — U0005: CPT

## 2021-02-27 ENCOUNTER — OUTPATIENT (OUTPATIENT)
Dept: OUTPATIENT SERVICES | Facility: HOSPITAL | Age: 51
LOS: 1 days | End: 2021-02-27
Payer: COMMERCIAL

## 2021-02-27 DIAGNOSIS — M67.00 SHORT ACHILLES TENDON (ACQUIRED), UNSPECIFIED ANKLE: Chronic | ICD-10-CM

## 2021-02-27 DIAGNOSIS — Z20.828 CONTACT WITH AND (SUSPECTED) EXPOSURE TO OTHER VIRAL COMMUNICABLE DISEASES: ICD-10-CM

## 2021-02-27 DIAGNOSIS — Z98.890 OTHER SPECIFIED POSTPROCEDURAL STATES: Chronic | ICD-10-CM

## 2021-02-27 LAB — SARS-COV-2 RNA SPEC QL NAA+PROBE: SIGNIFICANT CHANGE UP

## 2021-02-27 PROCEDURE — U0005: CPT

## 2021-02-27 PROCEDURE — U0003: CPT

## 2021-03-01 ENCOUNTER — OUTPATIENT (OUTPATIENT)
Dept: OUTPATIENT SERVICES | Facility: HOSPITAL | Age: 51
LOS: 1 days | End: 2021-03-01
Payer: COMMERCIAL

## 2021-03-01 DIAGNOSIS — Z98.890 OTHER SPECIFIED POSTPROCEDURAL STATES: Chronic | ICD-10-CM

## 2021-03-01 DIAGNOSIS — M54.16 RADICULOPATHY, LUMBAR REGION: ICD-10-CM

## 2021-03-01 DIAGNOSIS — M67.00 SHORT ACHILLES TENDON (ACQUIRED), UNSPECIFIED ANKLE: Chronic | ICD-10-CM

## 2021-03-01 PROCEDURE — 62323 NJX INTERLAMINAR LMBR/SAC: CPT

## 2021-03-03 ENCOUNTER — APPOINTMENT (OUTPATIENT)
Dept: CARDIOLOGY | Facility: CLINIC | Age: 51
End: 2021-03-03
Payer: COMMERCIAL

## 2021-03-03 ENCOUNTER — NON-APPOINTMENT (OUTPATIENT)
Age: 51
End: 2021-03-03

## 2021-03-03 VITALS
WEIGHT: 215 LBS | SYSTOLIC BLOOD PRESSURE: 126 MMHG | BODY MASS INDEX: 32.58 KG/M2 | HEART RATE: 68 BPM | OXYGEN SATURATION: 96 % | RESPIRATION RATE: 20 BRPM | TEMPERATURE: 97.6 F | DIASTOLIC BLOOD PRESSURE: 79 MMHG | HEIGHT: 68 IN

## 2021-03-03 DIAGNOSIS — I45.10 UNSPECIFIED RIGHT BUNDLE-BRANCH BLOCK: ICD-10-CM

## 2021-03-03 DIAGNOSIS — R06.00 DYSPNEA, UNSPECIFIED: ICD-10-CM

## 2021-03-03 DIAGNOSIS — I48.0 PAROXYSMAL ATRIAL FIBRILLATION: ICD-10-CM

## 2021-03-03 PROCEDURE — 99072 ADDL SUPL MATRL&STAF TM PHE: CPT

## 2021-03-03 PROCEDURE — 99243 OFF/OP CNSLTJ NEW/EST LOW 30: CPT

## 2021-03-03 PROCEDURE — 93000 ELECTROCARDIOGRAM COMPLETE: CPT

## 2021-03-03 RX ORDER — METOPROLOL SUCCINATE 25 MG/1
25 TABLET, EXTENDED RELEASE ORAL
Qty: 30 | Refills: 3 | Status: DISCONTINUED | COMMUNITY
Start: 2018-10-29 | End: 2021-03-03

## 2021-03-03 NOTE — PHYSICAL EXAM
[General Appearance - Well Developed] : well developed [Normal Appearance] : normal appearance [Well Groomed] : well groomed [General Appearance - Well Nourished] : well nourished [No Deformities] : no deformities [General Appearance - In No Acute Distress] : no acute distress [Normal Conjunctiva] : the conjunctiva exhibited no abnormalities [Eyelids - No Xanthelasma] : the eyelids demonstrated no xanthelasmas [Normal Oral Mucosa] : normal oral mucosa [No Oral Pallor] : no oral pallor [No Oral Cyanosis] : no oral cyanosis [Normal Jugular Venous A Waves Present] : normal jugular venous A waves present [Normal Jugular Venous V Waves Present] : normal jugular venous V waves present [No Jugular Venous Bradford A Waves] : no jugular venous bradford A waves [Respiration, Rhythm And Depth] : normal respiratory rhythm and effort [Exaggerated Use Of Accessory Muscles For Inspiration] : no accessory muscle use [Auscultation Breath Sounds / Voice Sounds] : lungs were clear to auscultation bilaterally [Abdomen Soft] : soft [Abdomen Tenderness] : non-tender [Abdomen Mass (___ Cm)] : no abdominal mass palpated [Abnormal Walk] : normal gait [Gait - Sufficient For Exercise Testing] : the gait was sufficient for exercise testing [Nail Clubbing] : no clubbing of the fingernails [Cyanosis, Localized] : no localized cyanosis [Petechial Hemorrhages (___cm)] : no petechial hemorrhages [Skin Color & Pigmentation] : normal skin color and pigmentation [] : no rash [No Venous Stasis] : no venous stasis [Skin Lesions] : no skin lesions [No Skin Ulcers] : no skin ulcer [No Xanthoma] : no  xanthoma was observed [Oriented To Time, Place, And Person] : oriented to person, place, and time [Affect] : the affect was normal [Mood] : the mood was normal [No Anxiety] : not feeling anxious [Normal Rate] : normal [Normal S1] : normal S1 [Normal S2] : normal S2 [S3] : no S3 [S4] : no S4 [No Murmur] : no murmurs heard [Right Carotid Bruit] : no bruit heard over the right carotid [Left Carotid Bruit] : no bruit heard over the left carotid [Right Femoral Bruit] : no bruit heard over the right femoral artery [Left Femoral Bruit] : no bruit heard over the left femoral artery [2+] : left 2+ [No Abnormalities] : the abdominal aorta was not enlarged and no bruit was heard [No Pitting Edema] : no pitting edema present

## 2021-03-03 NOTE — DISCUSSION/SUMMARY
[Paroxysmal Atrial Fibrillation] : paroxysmal atrial fibrillation [Coronary Artery Disease] : coronary artery disease [Echocardiogram] : echocardiogram [Multidetector Cardiac CT] : multidetector cardiac CT [Hyperlipidemia] : hyperlipidemia [Lipids Test Panel] : a fasting lipid profile [Stress Test Treadmill] : an exercise treadmill test [Family History of CAD] : family history of CAD [Atrial Fibrillation] : atrial fibrillation [Holter Monitor] : a Holter monitor [Medication Changes Per Orders] : Medication changes are as documented in orders [Asthma] : asthma [de-identified] : metoprolol prior to exercise, xarelto prn paf [de-identified] : nuclear stress

## 2021-03-08 ENCOUNTER — APPOINTMENT (OUTPATIENT)
Dept: CARDIOLOGY | Facility: CLINIC | Age: 51
End: 2021-03-08
Payer: COMMERCIAL

## 2021-03-08 PROCEDURE — 93306 TTE W/DOPPLER COMPLETE: CPT

## 2021-03-08 PROCEDURE — 99072 ADDL SUPL MATRL&STAF TM PHE: CPT

## 2021-03-15 ENCOUNTER — APPOINTMENT (OUTPATIENT)
Dept: CT IMAGING | Facility: CLINIC | Age: 51
End: 2021-03-15
Payer: COMMERCIAL

## 2021-03-15 ENCOUNTER — OUTPATIENT (OUTPATIENT)
Dept: OUTPATIENT SERVICES | Facility: HOSPITAL | Age: 51
LOS: 1 days | End: 2021-03-15
Payer: COMMERCIAL

## 2021-03-15 DIAGNOSIS — Z98.890 OTHER SPECIFIED POSTPROCEDURAL STATES: Chronic | ICD-10-CM

## 2021-03-15 DIAGNOSIS — Z00.8 ENCOUNTER FOR OTHER GENERAL EXAMINATION: ICD-10-CM

## 2021-03-15 DIAGNOSIS — M67.00 SHORT ACHILLES TENDON (ACQUIRED), UNSPECIFIED ANKLE: Chronic | ICD-10-CM

## 2021-03-15 PROCEDURE — 75574 CT ANGIO HRT W/3D IMAGE: CPT

## 2021-03-15 PROCEDURE — 75574 CT ANGIO HRT W/3D IMAGE: CPT | Mod: 26

## 2021-03-26 ENCOUNTER — RESULT REVIEW (OUTPATIENT)
Age: 51
End: 2021-03-26

## 2021-03-26 ENCOUNTER — OUTPATIENT (OUTPATIENT)
Dept: OUTPATIENT SERVICES | Facility: HOSPITAL | Age: 51
LOS: 1 days | End: 2021-03-26
Payer: COMMERCIAL

## 2021-03-26 DIAGNOSIS — M67.00 SHORT ACHILLES TENDON (ACQUIRED), UNSPECIFIED ANKLE: Chronic | ICD-10-CM

## 2021-03-26 DIAGNOSIS — R94.31 ABNORMAL ELECTROCARDIOGRAM [ECG] [EKG]: ICD-10-CM

## 2021-03-26 DIAGNOSIS — Z98.890 OTHER SPECIFIED POSTPROCEDURAL STATES: Chronic | ICD-10-CM

## 2021-03-26 PROCEDURE — 0502T: CPT

## 2021-03-26 PROCEDURE — 0503T: CPT

## 2021-03-26 PROCEDURE — 0504T: CPT

## 2021-03-28 ENCOUNTER — RX RENEWAL (OUTPATIENT)
Age: 51
End: 2021-03-28

## 2021-07-15 NOTE — H&P CARDIOLOGY - TIME:
Wound appears to be healed at this time. Advised not to allow waistbands to ride directly over healed incision line as it continues to mature. Follow up prn alterations in skin integrity.   14:50

## 2021-10-05 ENCOUNTER — OUTPATIENT (OUTPATIENT)
Dept: OUTPATIENT SERVICES | Facility: HOSPITAL | Age: 51
LOS: 1 days | End: 2021-10-05
Payer: COMMERCIAL

## 2021-10-05 DIAGNOSIS — Z98.890 OTHER SPECIFIED POSTPROCEDURAL STATES: Chronic | ICD-10-CM

## 2021-10-05 DIAGNOSIS — M67.00 SHORT ACHILLES TENDON (ACQUIRED), UNSPECIFIED ANKLE: Chronic | ICD-10-CM

## 2021-10-05 DIAGNOSIS — Z20.828 CONTACT WITH AND (SUSPECTED) EXPOSURE TO OTHER VIRAL COMMUNICABLE DISEASES: ICD-10-CM

## 2021-10-05 LAB — SARS-COV-2 RNA SPEC QL NAA+PROBE: SIGNIFICANT CHANGE UP

## 2021-10-05 PROCEDURE — 87635 SARS-COV-2 COVID-19 AMP PRB: CPT

## 2021-10-07 ENCOUNTER — OUTPATIENT (OUTPATIENT)
Dept: OUTPATIENT SERVICES | Facility: HOSPITAL | Age: 51
LOS: 1 days | End: 2021-10-07
Payer: COMMERCIAL

## 2021-10-07 DIAGNOSIS — M67.00 SHORT ACHILLES TENDON (ACQUIRED), UNSPECIFIED ANKLE: Chronic | ICD-10-CM

## 2021-10-07 DIAGNOSIS — M54.16 RADICULOPATHY, LUMBAR REGION: ICD-10-CM

## 2021-10-07 DIAGNOSIS — Z98.890 OTHER SPECIFIED POSTPROCEDURAL STATES: Chronic | ICD-10-CM

## 2021-10-07 PROCEDURE — 62323 NJX INTERLAMINAR LMBR/SAC: CPT

## 2022-02-01 ENCOUNTER — OUTPATIENT (OUTPATIENT)
Dept: OUTPATIENT SERVICES | Facility: HOSPITAL | Age: 52
LOS: 1 days | End: 2022-02-01
Payer: COMMERCIAL

## 2022-02-01 DIAGNOSIS — Z98.890 OTHER SPECIFIED POSTPROCEDURAL STATES: Chronic | ICD-10-CM

## 2022-02-01 DIAGNOSIS — Z20.828 CONTACT WITH AND (SUSPECTED) EXPOSURE TO OTHER VIRAL COMMUNICABLE DISEASES: ICD-10-CM

## 2022-02-01 DIAGNOSIS — M67.00 SHORT ACHILLES TENDON (ACQUIRED), UNSPECIFIED ANKLE: Chronic | ICD-10-CM

## 2022-02-01 PROCEDURE — U0005: CPT

## 2022-02-01 PROCEDURE — U0003: CPT

## 2022-02-02 LAB — SARS-COV-2 RNA SPEC QL NAA+PROBE: SIGNIFICANT CHANGE UP

## 2022-02-03 ENCOUNTER — OUTPATIENT (OUTPATIENT)
Dept: OUTPATIENT SERVICES | Facility: HOSPITAL | Age: 52
LOS: 1 days | End: 2022-02-03
Payer: COMMERCIAL

## 2022-02-03 DIAGNOSIS — Z98.890 OTHER SPECIFIED POSTPROCEDURAL STATES: Chronic | ICD-10-CM

## 2022-02-03 DIAGNOSIS — M67.00 SHORT ACHILLES TENDON (ACQUIRED), UNSPECIFIED ANKLE: Chronic | ICD-10-CM

## 2022-02-03 DIAGNOSIS — M54.16 RADICULOPATHY, LUMBAR REGION: ICD-10-CM

## 2022-02-03 PROCEDURE — 62323 NJX INTERLAMINAR LMBR/SAC: CPT

## 2022-04-05 ENCOUNTER — APPOINTMENT (OUTPATIENT)
Dept: ORTHOPEDIC SURGERY | Facility: CLINIC | Age: 52
End: 2022-04-05
Payer: COMMERCIAL

## 2022-04-05 VITALS — BODY MASS INDEX: 32.58 KG/M2 | WEIGHT: 220 LBS | HEIGHT: 69 IN

## 2022-04-05 DIAGNOSIS — Z78.9 OTHER SPECIFIED HEALTH STATUS: ICD-10-CM

## 2022-04-05 PROCEDURE — 99213 OFFICE O/P EST LOW 20 MIN: CPT

## 2022-04-05 RX ORDER — HYALURONATE SODIUM 20 MG/2 ML
20 SYRINGE (ML) INTRAARTICULAR AS DIRECTED
Qty: 1 | Refills: 0 | Status: DISCONTINUED | COMMUNITY
Start: 2022-04-05 | End: 2022-04-05

## 2022-04-05 RX ADMIN — Medication 2 MG/2ML: at 00:00

## 2022-04-05 NOTE — HISTORY OF PRESENT ILLNESS
[6] : 6 [1] : 2 [Dull/Aching] : dull/aching [Occasional] : occasional [Ice] : ice [Heat] : heat [Exercising] : exercising [de-identified] : 04/05/22:  Patient returns today with complaint of recurrent right knee pain that began  x 2-3 months duration. Would like to repeat Euflexxa injections.\par \par 01/26/21: Here today for Euflexxa injection #3 right knee. Is feeling about 80% better.\par \par 01/19/21: Returns today for Euflexxa injection #2 right knee. Minimal improvement as of yet, around 10%.\par \par 01/12/21: Here today to start Euflexxa injections #1 right knee. \par \par 01/05/21: Returns today for his right knee. Finished Euflexxa injections to both knees one year ago. His left knee is doing well, but his right knee is symptomatic again. He is interested in repeating Euflexxa injections for his right knee. \par \par 12/30/19: Returns today for Euflexxa injection #3 left knee. \par \par 12/24/19 Here for Euflexxa injection #2 lt knee.\par \par 12/16/19: Returns today to start Euflexxa injections LEFT knee.\par \par 12/09/19: Returns for Euflexxa injection #3 right knee. Good response with visco series. No pain with damp weather today. Decreased symptoms. Here with c/o left knee pain. Pain kneeling, bending, squatting and stairs on the left. Prior hx left knee scope - for meniscus tear 2015. Cortisone has not helped. Last visco series lt knee was 2 years ago helped until this summer. No locking, clicking or giving way.\par \par 12/02/19: Returns for Euflexxa injection #2 right knee. \par \par 11/25/19: Returns today to start Euflexxa injections right knee. \par \par 11/18/19: returns today still c/o rt knee pain despite cortisone injection x 6 weeks ago. Would like to try Euflexxa injections.\par \par 10/7/19 Returns today c/o recurring intermittent rt knee pain. Pt runs and is a . s/p arthroscopic menisectomy rt knee x2, last one in 2017.\par \par 1/16/18 Returns today c/o recurring rt knee pain. Last cortisone injection x 10 months ago w/ good relief. S/p arthroscopy and partial medial menisectomy rt knee by Dr. Maguire and Synvisc injection x 1 rt knee. Had a pseudoseptic rxn to the injection x 5 weeks ago.Then had a cortisone injection x 4 weeks ago.3/2/17 Return visit for this 45 yo male  who ran and played golf x 1 week ago and woke up next day c/o spon. onset of pain and stiffness rt knee. Limping.Taking advil prn. Saw a pain management\par specialist who referred him for an MRI rt knee. Report dated 2/28/17 revealed small undersurface tear amd thinning of\par cartilage medial compartment.\par PMH: s/p arthroscopic menisectomy rt knee x 8-9 years ago.\par  [] : no [FreeTextEntry1] : right knee [FreeTextEntry5] : no mechanism of injury. wear and tear on right knee [de-identified] : Dr. Aguilar [de-identified] : none

## 2022-04-11 ENCOUNTER — RX RENEWAL (OUTPATIENT)
Age: 52
End: 2022-04-11

## 2022-04-18 ENCOUNTER — APPOINTMENT (OUTPATIENT)
Dept: ORTHOPEDIC SURGERY | Facility: CLINIC | Age: 52
End: 2022-04-18
Payer: COMMERCIAL

## 2022-04-18 VITALS — WEIGHT: 220 LBS | BODY MASS INDEX: 32.58 KG/M2 | HEIGHT: 69 IN

## 2022-04-18 DIAGNOSIS — J45.909 UNSPECIFIED ASTHMA, UNCOMPLICATED: ICD-10-CM

## 2022-04-18 PROCEDURE — 20610 DRAIN/INJ JOINT/BURSA W/O US: CPT

## 2022-04-18 PROCEDURE — 99213 OFFICE O/P EST LOW 20 MIN: CPT | Mod: 25

## 2022-04-18 NOTE — PROCEDURE
[Large Joint Injection] : Large joint injection [Right] : of the right [Pain] : pain [Inflammation] : inflammation [Betadine] : betadine [Ethyl Chloride sprayed topically] : ethyl chloride sprayed topically [Euflexxa] : Euflexxa [Call if redness, pain or fever occur] : call if redness, pain or fever occur [Apply ice for 15min out of every hour for the next 12-24 hours as tolerated] : apply ice for 15 minutes out of every hour for the next 12-24 hours as tolerated [Risks, benefits, alternatives discussed / Verbal consent obtained] : the risks benefits, and alternatives have been discussed, and verbal consent was obtained [#1] : series #1

## 2022-04-18 NOTE — PHYSICAL EXAM
[Normal Mood and Affect] : normal mood and affect [Orientated] : orientated [Able to Communicate] : able to communicate [Well Developed] : well developed [Well Nourished] : well nourished [Right] : right knee [Negative] : negative anterior draw [Left] : left knee [NL (0)] : extension 0 degrees [5___] : hamstring 5[unfilled]/5 [] : non-antalgic [TWNoteComboBox7] : flexion 120 degrees

## 2022-04-18 NOTE — HISTORY OF PRESENT ILLNESS
[5] : 5 [Sharp] : sharp [Shooting] : shooting [Occasional] : occasional [Rest] : rest [Walking] : walking [Exercising] : exercising [] : no [FreeTextEntry1] : riight knee [de-identified] : 4/5/22 [de-identified] : dr grimes [de-identified] : 2017 [de-identified] : xray mri [de-identified] : 04/05/22:  Patient returns today with complaint of recurrent right knee pain that began  x 2-3 months duration. Would like to repeat Euflexxa injections.\par \par 01/26/21: Here today for Euflexxa injection #3 right knee. Is feeling about 80% better.\par \par 01/19/21: Returns today for Euflexxa injection #2 right knee. Minimal improvement as of yet, around 10%.\par \par 01/12/21: Here today to start Euflexxa injections #1 right knee. \par \par 01/05/21: Returns today for his right knee. Finished Euflexxa injections to both knees one year ago. His left knee is doing well, but his right knee is symptomatic again. He is interested in repeating Euflexxa injections for his right knee. \par \par 12/30/19: Returns today for Euflexxa injection #3 left knee. \par \par 12/24/19 Here for Euflexxa injection #2 lt knee.\par \par 12/16/19: Returns today to start Euflexxa injections LEFT knee.\par \par 12/09/19: Returns for Euflexxa injection #3 right knee. Good response with visco series. No pain with damp weather today. Decreased symptoms. Here with c/o left knee pain. Pain kneeling, bending, squatting and stairs on the left. Prior hx left knee scope - for meniscus tear 2015. Cortisone has not helped. Last visco series lt knee was 2 years ago helped until this summer. No locking, clicking or giving way.\par \par 12/02/19: Returns for Euflexxa injection #2 right knee. \par \par 11/25/19: Returns today to start Euflexxa injections right knee. \par \par 11/18/19: returns today still c/o rt knee pain despite cortisone injection x 6 weeks ago. Would like to try Euflexxa injections.\par \par 10/7/19 Returns today c/o recurring intermittent rt knee pain. Pt runs and is a . s/p arthroscopic menisectomy rt knee x2, last one in 2017.\par \par 1/16/18 Returns today c/o recurring rt knee pain. Last cortisone injection x 10 months ago w/ good relief. S/p arthroscopy and partial medial menisectomy rt knee by Dr. Maguire and Synvisc injection x 1 rt knee. Had a pseudoseptic rxn to the injection x 5 weeks ago.Then had a cortisone injection x 4 weeks ago.3/2/17 Return visit for this 45 yo male  who ran and played golf x 1 week ago and woke up next day c/o spon. onset of pain and stiffness rt knee. Limping.Taking advil prn. Saw a pain management\par specialist who referred him for an MRI rt knee. Report dated 2/28/17 revealed small undersurface tear amd thinning of\par cartilage medial compartment.\par PMH: s/p arthroscopic menisectomy rt knee x 8-9 years ago.\par

## 2022-04-29 ENCOUNTER — APPOINTMENT (OUTPATIENT)
Dept: ORTHOPEDIC SURGERY | Facility: CLINIC | Age: 52
End: 2022-04-29
Payer: COMMERCIAL

## 2022-04-29 VITALS — HEIGHT: 69 IN | WEIGHT: 220 LBS | BODY MASS INDEX: 32.58 KG/M2

## 2022-04-29 DIAGNOSIS — M23.8X1 OTHER INTERNAL DERANGEMENTS OF RIGHT KNEE: ICD-10-CM

## 2022-04-29 PROCEDURE — 99213 OFFICE O/P EST LOW 20 MIN: CPT | Mod: 25

## 2022-04-29 PROCEDURE — 20610 DRAIN/INJ JOINT/BURSA W/O US: CPT | Mod: RT

## 2022-04-29 NOTE — HISTORY OF PRESENT ILLNESS
[4] : 4 [0] : 0 [Dull/Aching] : dull/aching [Euflexxa] : Euflexxa [] : no [FreeTextEntry1] : right knee [de-identified] : none [de-identified] : 4/18/22 [de-identified] : right knee [TWNoteComboBox1] : 20% [de-identified] : 04/29/22:  Here today for Euflexxa injection #2 right knee. \par \par 04/05/22:  Patient returns today with complaint of recurrent right knee pain that began  x 2-3 months duration. Would like to repeat Euflexxa injections.\par \par 01/26/21: Here today for Euflexxa injection #3 right knee. Is feeling about 80% better.\par \par 01/19/21: Returns today for Euflexxa injection #2 right knee. Minimal improvement as of yet, around 10%.\par \par 01/12/21: Here today to start Euflexxa injections #1 right knee. \par \par 01/05/21: Returns today for his right knee. Finished Euflexxa injections to both knees one year ago. His left knee is doing well, but his right knee is symptomatic again. He is interested in repeating Euflexxa injections for his right knee. \par \par 12/30/19: Returns today for Euflexxa injection #3 left knee. \par \par 12/24/19 Here for Euflexxa injection #2 lt knee.\par \par 12/16/19: Returns today to start Euflexxa injections LEFT knee.\par \par 12/09/19: Returns for Euflexxa injection #3 right knee. Good response with visco series. No pain with damp weather today. Decreased symptoms. Here with c/o left knee pain. Pain kneeling, bending, squatting and stairs on the left. Prior hx left knee scope - for meniscus tear 2015. Cortisone has not helped. Last visco series lt knee was 2 years ago helped until this summer. No locking, clicking or giving way.\par \par 12/02/19: Returns for Euflexxa injection #2 right knee. \par \par 11/25/19: Returns today to start Euflexxa injections right knee. \par \par 11/18/19: returns today still c/o rt knee pain despite cortisone injection x 6 weeks ago. Would like to try Euflexxa injections.\par \par 10/7/19 Returns today c/o recurring intermittent rt knee pain. Pt runs and is a . s/p arthroscopic menisectomy rt knee x2, last one in 2017.\par \par 1/16/18 Returns today c/o recurring rt knee pain. Last cortisone injection x 10 months ago w/ good relief. S/p arthroscopy and partial medial menisectomy rt knee by Dr. Maguire and Synvisc injection x 1 rt knee. Had a pseudoseptic rxn to the injection x 5 weeks ago.Then had a cortisone injection x 4 weeks ago.3/2/17 Return visit for this 47 yo male  who ran and played golf x 1 week ago and woke up next day c/o spon. onset of pain and stiffness rt knee. Limping.Taking advil prn. Saw a pain management\par specialist who referred him for an MRI rt knee. Report dated 2/28/17 revealed small undersurface tear amd thinning of\par cartilage medial compartment.\par PMH: s/p arthroscopic menisectomy rt knee x 8-9 years ago.\par

## 2022-04-29 NOTE — PROCEDURE
[Large Joint Injection] : Large joint injection [Right] : of the right [Pain] : pain [Inflammation] : inflammation [Betadine] : betadine [Ethyl Chloride sprayed topically] : ethyl chloride sprayed topically [Euflexxa] : Euflexxa [Call if redness, pain or fever occur] : call if redness, pain or fever occur [Apply ice for 15min out of every hour for the next 12-24 hours as tolerated] : apply ice for 15 minutes out of every hour for the next 12-24 hours as tolerated [Risks, benefits, alternatives discussed / Verbal consent obtained] : the risks benefits, and alternatives have been discussed, and verbal consent was obtained [#2] : series #2

## 2022-04-29 NOTE — REVIEW OF SYSTEMS
[Joint Pain] : joint pain [Negative] : Respiratory [FreeTextEntry5] : afib cholesterol [FreeTextEntry6] : asthma

## 2022-05-03 RX ORDER — PIROXICAM 20 MG/1
20 CAPSULE ORAL DAILY
Qty: 30 | Refills: 5 | Status: ACTIVE | COMMUNITY
Start: 2022-05-03 | End: 1900-01-01

## 2022-05-10 ENCOUNTER — APPOINTMENT (OUTPATIENT)
Dept: ORTHOPEDIC SURGERY | Facility: CLINIC | Age: 52
End: 2022-05-10
Payer: COMMERCIAL

## 2022-05-10 VITALS — WEIGHT: 220 LBS | HEIGHT: 69 IN | BODY MASS INDEX: 32.58 KG/M2

## 2022-05-10 PROCEDURE — 99213 OFFICE O/P EST LOW 20 MIN: CPT | Mod: 25

## 2022-05-10 PROCEDURE — 20610 DRAIN/INJ JOINT/BURSA W/O US: CPT | Mod: RT

## 2022-05-10 NOTE — HISTORY OF PRESENT ILLNESS
[4] : 4 [0] : 0 [Dull/Aching] : dull/aching [Rest] : rest [Standing] : standing [Euflexxa] : Euflexxa [] : no [FreeTextEntry1] : right knee  [de-identified] : 4/2022 [de-identified] : 4/20/22 [de-identified] : right knee [de-identified] : Euflexxa [TWNoteComboBox1] : 100% [de-identified] : 05/10/22:  Returns today for Euflexxa injection #3 right knee. Doing well.\par \par 04/29/22:  Here today for Euflexxa injection #2 right knee. \par \par 04/05/22:  Patient returns today with complaint of recurrent right knee pain that began  x 2-3 months duration. Would like to repeat Euflexxa injections.\par \par 01/26/21: Here today for Euflexxa injection #3 right knee. Is feeling about 80% better.\par \par 01/19/21: Returns today for Euflexxa injection #2 right knee. Minimal improvement as of yet, around 10%.\par \par 01/12/21: Here today to start Euflexxa injections #1 right knee. \par \par 01/05/21: Returns today for his right knee. Finished Euflexxa injections to both knees one year ago. His left knee is doing well, but his right knee is symptomatic again. He is interested in repeating Euflexxa injections for his right knee. \par \par 12/30/19: Returns today for Euflexxa injection #3 left knee. \par \par 12/24/19 Here for Euflexxa injection #2 lt knee.\par \par 12/16/19: Returns today to start Euflexxa injections LEFT knee.\par \par 12/09/19: Returns for Euflexxa injection #3 right knee. Good response with visco series. No pain with damp weather today. Decreased symptoms. Here with c/o left knee pain. Pain kneeling, bending, squatting and stairs on the left. Prior hx left knee scope - for meniscus tear 2015. Cortisone has not helped. Last visco series lt knee was 2 years ago helped until this summer. No locking, clicking or giving way.\par \par 12/02/19: Returns for Euflexxa injection #2 right knee. \par \par 11/25/19: Returns today to start Euflexxa injections right knee. \par \par 11/18/19: returns today still c/o rt knee pain despite cortisone injection x 6 weeks ago. Would like to try Euflexxa injections.\par \par 10/7/19 Returns today c/o recurring intermittent rt knee pain. Pt runs and is a . s/p arthroscopic menisectomy rt knee x2, last one in 2017.\par \par 1/16/18 Returns today c/o recurring rt knee pain. Last cortisone injection x 10 months ago w/ good relief. S/p arthroscopy and partial medial menisectomy rt knee by Dr. Maguire and Synvisc injection x 1 rt knee. Had a pseudoseptic rxn to the injection x 5 weeks ago.Then had a cortisone injection x 4 weeks ago.3/2/17 Return visit for this 45 yo male  who ran and played golf x 1 week ago and woke up next day c/o spon. onset of pain and stiffness rt knee. Limping.Taking advil prn. Saw a pain management\par specialist who referred him for an MRI rt knee. Report dated 2/28/17 revealed small undersurface tear amd thinning of\par cartilage medial compartment.\par PMH: s/p arthroscopic menisectomy rt knee x 8-9 years ago.\par

## 2022-05-10 NOTE — REVIEW OF SYSTEMS
[Joint Pain] : joint pain [Negative] : Heme/Lymph [FreeTextEntry5] : afib cholesterol [FreeTextEntry6] : asthma

## 2022-05-10 NOTE — PROCEDURE
[Large Joint Injection] : Large joint injection [Right] : of the right [Knee] : knee [Pain] : pain [Inflammation] : inflammation [Betadine] : betadine [Ethyl Chloride sprayed topically] : ethyl chloride sprayed topically [Euflexxa] : Euflexxa [#3] : series #3 [] : Patient tolerated procedure well [Call if redness, pain or fever occur] : call if redness, pain or fever occur [Apply ice for 15min out of every hour for the next 12-24 hours as tolerated] : apply ice for 15 minutes out of every hour for the next 12-24 hours as tolerated [Risks, benefits, alternatives discussed / Verbal consent obtained] : the risks benefits, and alternatives have been discussed, and verbal consent was obtained

## 2022-06-21 ENCOUNTER — RX RENEWAL (OUTPATIENT)
Age: 52
End: 2022-06-21

## 2022-09-21 ENCOUNTER — NON-APPOINTMENT (OUTPATIENT)
Age: 52
End: 2022-09-21

## 2022-11-10 ENCOUNTER — RX RENEWAL (OUTPATIENT)
Age: 52
End: 2022-11-10

## 2022-11-10 RX ORDER — PIROXICAM 20 MG/1
20 CAPSULE ORAL DAILY
Qty: 30 | Refills: 5 | Status: ACTIVE | COMMUNITY
Start: 2022-05-03 | End: 1900-01-01

## 2023-01-10 ENCOUNTER — RX RENEWAL (OUTPATIENT)
Age: 53
End: 2023-01-10

## 2023-01-11 ENCOUNTER — RX RENEWAL (OUTPATIENT)
Age: 53
End: 2023-01-11

## 2023-01-31 ENCOUNTER — APPOINTMENT (OUTPATIENT)
Dept: ORTHOPEDIC SURGERY | Facility: CLINIC | Age: 53
End: 2023-01-31
Payer: COMMERCIAL

## 2023-01-31 VITALS — BODY MASS INDEX: 32.58 KG/M2 | HEIGHT: 69 IN | WEIGHT: 220 LBS

## 2023-01-31 DIAGNOSIS — E78.00 PURE HYPERCHOLESTEROLEMIA, UNSPECIFIED: ICD-10-CM

## 2023-01-31 PROCEDURE — 99213 OFFICE O/P EST LOW 20 MIN: CPT

## 2023-01-31 PROCEDURE — 73562 X-RAY EXAM OF KNEE 3: CPT | Mod: LT

## 2023-01-31 RX ADMIN — Medication 2 MG/2ML: at 00:00

## 2023-01-31 NOTE — PHYSICAL EXAM
[Normal Mood and Affect] : normal mood and affect [Orientated] : orientated [Able to Communicate] : able to communicate [Well Developed] : well developed [Well Nourished] : well nourished [Right] : right knee [Negative] : negative anterior draw [NL (0)] : extension 0 degrees [5___] : hamstring 5[unfilled]/5 [Left] : left knee [AP] : anteroposterior [Lateral] : lateral [[place]] : skyline [] : non-antalgic [FreeTextEntry9] : Small spur lateral tibial condyle. [TWNoteComboBox7] : flexion 120 degrees

## 2023-01-31 NOTE — DISCUSSION/SUMMARY
[de-identified] : "Written by Oriana Bear, acting as Scribe for Keenan Aguilar MD."\par \par Dr. Aguilar - \par The documentation recorded by the scribe accurately reflects the service I personally performed and the decisions made by me.

## 2023-01-31 NOTE — HISTORY OF PRESENT ILLNESS
[5] : 5 [0] : 0 [Dull/Aching] : dull/aching [Intermittent] : intermittent [Meds] : meds [Standing] : standing [Full time] : Work status: full time [de-identified] : 01/31/23:  Returns today with complaint of recurrent bilateral knee pain lt > rt x last 3 weeks duration after playing golf. Has am stiffness. Last Euflexxa injections rt knee x 8 months ago with good relief and 3 years since having Euflexxa injections lt knee. Would like to repeat the injections.\par \par 05/10/22: Returns today for Euflexxa injection #3 right knee. Doing well.\par \par 04/29/22:  Here today for Euflexxa injection #2 right knee. \par \par 04/05/22:  Patient returns today with complaint of recurrent right knee pain that began  x 2-3 months duration. Would like to repeat Euflexxa injections.\par \par 01/26/21: Here today for Euflexxa injection #3 right knee. Is feeling about 80% better.\par \par 01/19/21: Returns today for Euflexxa injection #2 right knee. Minimal improvement as of yet, around 10%.\par \par 01/12/21: Here today to start Euflexxa injections #1 right knee. \par \par 01/05/21: Returns today for his right knee. Finished Euflexxa injections to both knees one year ago. His left knee is doing well, but his right knee is symptomatic again. He is interested in repeating Euflexxa injections for his right knee. \par \par 12/30/19: Returns today for Euflexxa injection #3 left knee. \par \par 12/24/19 Here for Euflexxa injection #2 lt knee.\par \par 12/16/19: Returns today to start Euflexxa injections LEFT knee.\par \par 12/09/19: Returns for Euflexxa injection #3 right knee. Good response with visco series. No pain with damp weather today. Decreased symptoms. Here with c/o left knee pain. Pain kneeling, bending, squatting and stairs on the left. Prior hx left knee scope - for meniscus tear 2015. Cortisone has not helped. Last visco series lt knee was 2 years ago helped until this summer. No locking, clicking or giving way.\par \par 12/02/19: Returns for Euflexxa injection #2 right knee. \par \par 11/25/19: Returns today to start Euflexxa injections right knee. \par \par 11/18/19: returns today still c/o rt knee pain despite cortisone injection x 6 weeks ago. Would like to try Euflexxa injections.\par \par 10/7/19 Returns today c/o recurring intermittent rt knee pain. Pt runs and is a . s/p arthroscopic menisectomy rt knee x2, last one in 2017.\par \par 1/16/18 Returns today c/o recurring rt knee pain. Last cortisone injection x 10 months ago w/ good relief. S/p arthroscopy and partial medial menisectomy rt knee by Dr. Maguire and Synvisc injection x 1 rt knee. Had a pseudoseptic rxn to the injection x 5 weeks ago.Then had a cortisone injection x 4 weeks ago.3/2/17 Return visit for this 45 yo male  who ran and played golf x 1 week ago and woke up next day c/o spon. onset of pain and stiffness rt knee. Limping.Taking advil prn. Saw a pain management\par specialist who referred him for an MRI rt knee. Report dated 2/28/17 revealed small undersurface tear amd thinning of\par cartilage medial compartment.\par PMH: s/p arthroscopic menisectomy rt knee x 8-9 years ago.\par  [] : no [FreeTextEntry1] : left knee [de-identified] : dr grimes [de-identified] : 2010 [de-identified] : instuctor [de-identified] : HEP

## 2023-02-01 ENCOUNTER — NON-APPOINTMENT (OUTPATIENT)
Age: 53
End: 2023-02-01

## 2023-02-01 DIAGNOSIS — M54.12 RADICULOPATHY, CERVICAL REGION: ICD-10-CM

## 2023-02-01 DIAGNOSIS — G89.29 OTHER CHRONIC PAIN: ICD-10-CM

## 2023-02-01 RX ORDER — ALBUTEROL SULFATE 90 UG/1
108 (90 BASE) AEROSOL, METERED RESPIRATORY (INHALATION)
Refills: 0 | Status: ACTIVE | COMMUNITY

## 2023-02-01 RX ORDER — TESTOSTERONE CYPIONATE 200 MG/ML
200 VIAL (ML) INTRAMUSCULAR
Refills: 0 | Status: ACTIVE | COMMUNITY

## 2023-02-01 RX ORDER — ROSUVASTATIN CALCIUM 5 MG/1
5 TABLET, FILM COATED ORAL DAILY
Refills: 0 | Status: ACTIVE | COMMUNITY

## 2023-02-06 ENCOUNTER — APPOINTMENT (OUTPATIENT)
Dept: PAIN MANAGEMENT | Facility: CLINIC | Age: 53
End: 2023-02-06
Payer: COMMERCIAL

## 2023-02-06 VITALS — BODY MASS INDEX: 33.34 KG/M2 | HEIGHT: 68 IN | WEIGHT: 220 LBS

## 2023-02-06 PROCEDURE — 99213 OFFICE O/P EST LOW 20 MIN: CPT | Mod: 95

## 2023-02-06 NOTE — ASSESSMENT
[FreeTextEntry1] : Interim history\par he patient returns with pain that has been treated adequately in the past with epidural steroid injections.  The patient's complaints are consistent with radiculopathy. Patient's ADL's increase with prior LATRICE.   The last injection gave greater than 60% reduction of the pain but now there is a return of symptoms. The patient is requesting a subsequent epidural steroid injection to alleviate pain and improve functional ability and quality of life.  Patient is a candidate.\par Objective findings\par Since the last visit, there have been no new imaging studies. THe patient has no new symptoms except the return of the their pain complaints. Motor and sensory function is unchanged.\par Plan\par Spoke to patient about epidural steroid injections. Explained risks, benefits and alternatives including but not limited to the risk of infection, bleeding, headache, syncopal episode, failure to resolve issues, allergic reaction, symptom recurrence, allergic reaction, nerve injury, and increased pain. The patient understands the risks. All questions were answered. The patient is willing to proceed.\par

## 2023-02-06 NOTE — HISTORY OF PRESENT ILLNESS
[Lower back] : lower back [6] : 6 [Burning] : burning [Diffuse] : diffuse [Shooting] : shooting [Stabbing] : stabbing [Constant] : constant [Rest] : rest [Ice] : ice [Heat] : heat [Massage] : massage [Injection therapy] : injection therapy [Bending forward] : bending forward [Full time] : Work status: full time [Home] : at home, [unfilled] , at the time of the visit. [Medical Office: ([facility])___] : at the medical office located in  [Verbal consent obtained from patient] : the patient, [unfilled] [] : Post Surgical Visit: no

## 2023-02-07 ENCOUNTER — APPOINTMENT (OUTPATIENT)
Dept: ORTHOPEDIC SURGERY | Facility: CLINIC | Age: 53
End: 2023-02-07
Payer: COMMERCIAL

## 2023-02-07 VITALS — HEIGHT: 68 IN | BODY MASS INDEX: 33.34 KG/M2 | WEIGHT: 220 LBS

## 2023-02-07 PROCEDURE — 99213 OFFICE O/P EST LOW 20 MIN: CPT | Mod: 25

## 2023-02-07 PROCEDURE — 20610 DRAIN/INJ JOINT/BURSA W/O US: CPT | Mod: RT

## 2023-02-07 NOTE — PHYSICAL EXAM
[Normal Mood and Affect] : normal mood and affect [Orientated] : orientated [Able to Communicate] : able to communicate [Well Developed] : well developed [Well Nourished] : well nourished [Right] : right knee [Negative] : negative anterior draw [NL (0)] : extension 0 degrees [5___] : hamstring 5[unfilled]/5 [Left] : left knee [AP] : anteroposterior [Lateral] : lateral [[place]] : skyline [] : non-antalgic [FreeTextEntry9] : Small spur lateral tibial condyle. [TWNoteComboBox7] : flexion 120 degrees

## 2023-02-07 NOTE — HISTORY OF PRESENT ILLNESS
[6] : 6 [5] : 5 [Dull/Aching] : dull/aching [Sharp] : sharp [Constant] : constant [Rest] : rest [Walking] : walking [Stairs] : stairs [Full time] : Work status: full time [] : This patient has had an injection before: yes [1] : 1 [Euflexxa] : Euflexxa [de-identified] : 02/07/23:  Returns today to begin Euflexxa injections both knees. \par \par 01/31/23:  Returns today with complaint of recurrent bilateral knee pain lt > rt x last 3 weeks duration after playing golf. Has am stiffness. Last Euflexxa injections rt knee x 8 months ago with good relief and 3 years since having Euflexxa injections lt knee. Would like to repeat the injections.\par \par 05/10/22: Returns today for Euflexxa injection #3 right knee. Doing well.\par \par 04/29/22:  Here today for Euflexxa injection #2 right knee. \par \par 04/05/22:  Patient returns today with complaint of recurrent right knee pain that began  x 2-3 months duration. Would like to repeat Euflexxa injections.\par \par 01/26/21: Here today for Euflexxa injection #3 right knee. Is feeling about 80% better.\par \par 01/19/21: Returns today for Euflexxa injection #2 right knee. Minimal improvement as of yet, around 10%.\par \par 01/12/21: Here today to start Euflexxa injections #1 right knee. \par \par 01/05/21: Returns today for his right knee. Finished Euflexxa injections to both knees one year ago. His left knee is doing well, but his right knee is symptomatic again. He is interested in repeating Euflexxa injections for his right knee. \par \par 12/30/19: Returns today for Euflexxa injection #3 left knee. \par \par 12/24/19 Here for Euflexxa injection #2 lt knee.\par \par 12/16/19: Returns today to start Euflexxa injections LEFT knee.\par \par 12/09/19: Returns for Euflexxa injection #3 right knee. Good response with visco series. No pain with damp weather today. Decreased symptoms. Here with c/o left knee pain. Pain kneeling, bending, squatting and stairs on the left. Prior hx left knee scope - for meniscus tear 2015. Cortisone has not helped. Last visco series lt knee was 2 years ago helped until this summer. No locking, clicking or giving way.\par \par 12/02/19: Returns for Euflexxa injection #2 right knee. \par \par 11/25/19: Returns today to start Euflexxa injections right knee. \par \par 11/18/19: returns today still c/o rt knee pain despite cortisone injection x 6 weeks ago. Would like to try Euflexxa injections.\par \par 10/7/19 Returns today c/o recurring intermittent rt knee pain. Pt runs and is a . s/p arthroscopic menisectomy rt knee x2, last one in 2017.\par \par 1/16/18 Returns today c/o recurring rt knee pain. Last cortisone injection x 10 months ago w/ good relief. S/p arthroscopy and partial medial menisectomy rt knee by Dr. Maguire and Synvisc injection x 1 rt knee. Had a pseudoseptic rxn to the injection x 5 weeks ago.Then had a cortisone injection x 4 weeks ago.3/2/17 Return visit for this 45 yo male  who ran and played golf x 1 week ago and woke up next day c/o spon. onset of pain and stiffness rt knee. Limping.Taking advil prn. Saw a pain management\par specialist who referred him for an MRI rt knee. Report dated 2/28/17 revealed small undersurface tear amd thinning of\par cartilage medial compartment.\par PMH: s/p arthroscopic menisectomy rt knee x 8-9 years ago.\par  [FreeTextEntry1] : bilateral knees [de-identified] : getting up [de-identified] : HEP [de-identified] : 2022 [de-identified] : bilateral knees

## 2023-02-07 NOTE — PROCEDURE
[Large Joint Injection] : Large joint injection [Bilateral] : bilaterally of the [Knee] : knee [Pain] : pain [X-ray evidence of Osteoarthritis on this or prior visit] : x-ray evidence of Osteoarthritis on this or prior visit [Betadine] : betadine [Ethyl Chloride sprayed topically] : ethyl chloride sprayed topically [Euflexxa(20mg)] : 20mg of Euflexxa [#1] : series #1 [] : Patient tolerated procedure well [Call if redness, pain or fever occur] : call if redness, pain or fever occur [Apply ice for 15min out of every hour for the next 12-24 hours as tolerated] : apply ice for 15 minutes out of every hour for the next 12-24 hours as tolerated [Risks, benefits, alternatives discussed / Verbal consent obtained] : the risks benefits, and alternatives have been discussed, and verbal consent was obtained

## 2023-02-07 NOTE — DISCUSSION/SUMMARY
[de-identified] : "Written by Oriana Bear, acting as Scribe for Keenan Aguilar MD."\par \par Dr. Aguilar - \par The documentation recorded by the scribe accurately reflects the service I personally performed and the decisions made by me.

## 2023-02-13 ENCOUNTER — NON-APPOINTMENT (OUTPATIENT)
Age: 53
End: 2023-02-13

## 2023-02-13 ENCOUNTER — APPOINTMENT (OUTPATIENT)
Dept: CARDIOLOGY | Facility: CLINIC | Age: 53
End: 2023-02-13
Payer: COMMERCIAL

## 2023-02-13 VITALS
SYSTOLIC BLOOD PRESSURE: 135 MMHG | OXYGEN SATURATION: 96 % | WEIGHT: 220 LBS | HEART RATE: 58 BPM | TEMPERATURE: 96.3 F | BODY MASS INDEX: 33.34 KG/M2 | RESPIRATION RATE: 20 BRPM | DIASTOLIC BLOOD PRESSURE: 62 MMHG | HEIGHT: 68 IN

## 2023-02-13 DIAGNOSIS — R94.31 ABNORMAL ELECTROCARDIOGRAM [ECG] [EKG]: ICD-10-CM

## 2023-02-13 PROCEDURE — 93000 ELECTROCARDIOGRAM COMPLETE: CPT

## 2023-02-13 PROCEDURE — 99215 OFFICE O/P EST HI 40 MIN: CPT | Mod: 25

## 2023-02-13 RX ORDER — ROSUVASTATIN CALCIUM 10 MG/1
10 TABLET, FILM COATED ORAL
Qty: 30 | Refills: 0 | Status: DISCONTINUED | COMMUNITY
Start: 2017-10-26 | End: 2023-02-13

## 2023-02-13 RX ORDER — METOPROLOL TARTRATE 25 MG/1
25 TABLET, FILM COATED ORAL
Qty: 180 | Refills: 2 | Status: ACTIVE | COMMUNITY
Start: 2018-11-14 | End: 1900-01-01

## 2023-02-13 NOTE — REASON FOR VISIT
[Consultation] : a consultation regarding [FreeTextEntry2] : last seen 2018, h/o paf, mild-mod cad. pt claims if runs without takinhg his metoprolol he can feel af which can last a few days, last was the prior month. he takes xarelto and metoprolol prn

## 2023-02-13 NOTE — PROCEDURE
[Large Joint Injection] : Large joint injection [Bilateral] : bilaterally of the [Knee] : knee [Pain] : pain [X-ray evidence of Osteoarthritis on this or prior visit] : x-ray evidence of Osteoarthritis on this or prior visit [Betadine] : betadine [Ethyl Chloride sprayed topically] : ethyl chloride sprayed topically [Euflexxa(20mg)] : 20mg of Euflexxa [#2] : series #2 [] : Patient tolerated procedure well [Call if redness, pain or fever occur] : call if redness, pain or fever occur [Apply ice for 15min out of every hour for the next 12-24 hours as tolerated] : apply ice for 15 minutes out of every hour for the next 12-24 hours as tolerated [Risks, benefits, alternatives discussed / Verbal consent obtained] : the risks benefits, and alternatives have been discussed, and verbal consent was obtained

## 2023-02-13 NOTE — DISCUSSION/SUMMARY
[Paroxysmal Atrial Fibrillation] : paroxysmal atrial fibrillation [Coronary Artery Disease] : coronary artery disease [Multidetector Cardiac CT] : multidetector cardiac CT [Hyperlipidemia] : hyperlipidemia [Lipids Test Panel] : a fasting lipid profile [Stress Test Treadmill] : an exercise treadmill test [Family History of CAD] : family history of CAD [Atrial Fibrillation] : atrial fibrillation [Asthma] : asthma [Medication Changes Per Orders] : Medication changes are as documented in orders [Responding to Treatment] : responding to treatment [Echocardiogram] : an echocardiogram [Stable] : stable [None] : none [Minutes Spent: ___] : for [unfilled] ~Uminutes [de-identified] : instructed pt to take metoprolol tartrate 25 daily, can take extra dose prn [de-identified] : 40% RCA dz on cardiac cath [de-identified] : future event monitor if inc episodes [FreeTextEntry2] : last seen 2018, reviewed prior cardiac tests

## 2023-02-13 NOTE — PHYSICAL EXAM
[General Appearance - Well Developed] : well developed [Normal Appearance] : normal appearance [Well Groomed] : well groomed [General Appearance - Well Nourished] : well nourished [No Deformities] : no deformities [General Appearance - In No Acute Distress] : no acute distress [Normal Conjunctiva] : the conjunctiva exhibited no abnormalities [Eyelids - No Xanthelasma] : the eyelids demonstrated no xanthelasmas [Normal Oral Mucosa] : normal oral mucosa [No Oral Pallor] : no oral pallor [No Oral Cyanosis] : no oral cyanosis [Normal Jugular Venous A Waves Present] : normal jugular venous A waves present [Normal Jugular Venous V Waves Present] : normal jugular venous V waves present [No Jugular Venous Bradford A Waves] : no jugular venous bradford A waves [Respiration, Rhythm And Depth] : normal respiratory rhythm and effort [Exaggerated Use Of Accessory Muscles For Inspiration] : no accessory muscle use [Auscultation Breath Sounds / Voice Sounds] : lungs were clear to auscultation bilaterally [Abdomen Soft] : soft [Abdomen Tenderness] : non-tender [Abdomen Mass (___ Cm)] : no abdominal mass palpated [Abnormal Walk] : normal gait [Gait - Sufficient For Exercise Testing] : the gait was sufficient for exercise testing [Nail Clubbing] : no clubbing of the fingernails [Cyanosis, Localized] : no localized cyanosis [Petechial Hemorrhages (___cm)] : no petechial hemorrhages [Skin Color & Pigmentation] : normal skin color and pigmentation [] : no rash [No Venous Stasis] : no venous stasis [Skin Lesions] : no skin lesions [No Skin Ulcers] : no skin ulcer [No Xanthoma] : no  xanthoma was observed [Oriented To Time, Place, And Person] : oriented to person, place, and time [Affect] : the affect was normal [Mood] : the mood was normal [No Anxiety] : not feeling anxious [Normal Rate] : normal [Normal S1] : normal S1 [Normal S2] : normal S2 [No Murmur] : no murmurs heard [2+] : left 2+ [No Abnormalities] : the abdominal aorta was not enlarged and no bruit was heard [No Pitting Edema] : no pitting edema present [S3] : no S3 [S4] : no S4 [Right Carotid Bruit] : no bruit heard over the right carotid [Left Carotid Bruit] : no bruit heard over the left carotid [Right Femoral Bruit] : no bruit heard over the right femoral artery [Left Femoral Bruit] : no bruit heard over the left femoral artery

## 2023-02-14 ENCOUNTER — APPOINTMENT (OUTPATIENT)
Dept: ORTHOPEDIC SURGERY | Facility: CLINIC | Age: 53
End: 2023-02-14
Payer: COMMERCIAL

## 2023-02-14 VITALS — BODY MASS INDEX: 33.34 KG/M2 | HEIGHT: 68 IN | WEIGHT: 220 LBS

## 2023-02-14 PROCEDURE — 99213 OFFICE O/P EST LOW 20 MIN: CPT | Mod: 25

## 2023-02-14 PROCEDURE — 20610 DRAIN/INJ JOINT/BURSA W/O US: CPT | Mod: LT

## 2023-02-14 NOTE — DISCUSSION/SUMMARY
[de-identified] : "Written by Oriana Bear, acting as Scribe for Keenan Aguilar MD."\par \par Dr. Aguilar - \par The documentation recorded by the scribe accurately reflects the service I personally performed and the decisions made by me.

## 2023-02-14 NOTE — HISTORY OF PRESENT ILLNESS
[8] : 8 [6] : 6 [Dull/Aching] : dull/aching [Intermittent] : intermittent [Full time] : Work status: full time [2] : 2 [Euflexxa] : Euflexxa [] : yes [FreeTextEntry1] : bilateral knee [de-identified] : hep [de-identified] : 2/7/23 [de-identified] : bilateral knees  [de-identified] : Euflexxa [de-identified] : none [TWNoteComboBox1] : 30%

## 2023-02-14 NOTE — PHYSICAL EXAM
[Normal Mood and Affect] : normal mood and affect [Orientated] : orientated [Able to Communicate] : able to communicate [Well Developed] : well developed [Well Nourished] : well nourished [Right] : right knee [Negative] : negative anterior draw [NL (0)] : extension 0 degrees [5___] : hamstring 5[unfilled]/5 [Left] : left knee [AP] : anteroposterior [Lateral] : lateral [[place]] : skyline [] : non-antalgic [FreeTextEntry9] : Small spur lateral tibial condyle. [TWNoteComboBox7] : flexion 120 degrees

## 2023-02-15 ENCOUNTER — TRANSCRIPTION ENCOUNTER (OUTPATIENT)
Age: 53
End: 2023-02-15

## 2023-02-15 NOTE — ASU PATIENT PROFILE, ADULT - FALL HARM RISK - UNIVERSAL INTERVENTIONS
Bed in lowest position, wheels locked, appropriate side rails in place/Call bell, personal items and telephone in reach/Instruct patient to call for assistance before getting out of bed or chair/Non-slip footwear when patient is out of bed/[place] to call system/Physically safe environment - no spills, clutter or unnecessary equipment/Purposeful Proactive Rounding/Room/bathroom lighting operational, light cord in reach

## 2023-02-15 NOTE — ASU DISCHARGE PLAN (ADULT/PEDIATRIC) - PATIENT EDUCATION MATERIALS PROVIED
Provider pre-printed instructions given Refer to Dr. Galindo Post Procedure Discharge Instructions/Provider pre-printed instructions given

## 2023-02-15 NOTE — ASU PATIENT PROFILE, ADULT - NSICDXPASTSURGICALHX_GEN_ALL_CORE_FT
PAST SURGICAL HISTORY:  Achilles tendon contracture s/p surgery    H/O arthroscopic knee surgery

## 2023-02-22 ENCOUNTER — OUTPATIENT (OUTPATIENT)
Dept: OUTPATIENT SERVICES | Facility: HOSPITAL | Age: 53
LOS: 1 days | End: 2023-02-22
Payer: COMMERCIAL

## 2023-02-22 DIAGNOSIS — M67.00 SHORT ACHILLES TENDON (ACQUIRED), UNSPECIFIED ANKLE: Chronic | ICD-10-CM

## 2023-02-22 DIAGNOSIS — Z98.890 OTHER SPECIFIED POSTPROCEDURAL STATES: Chronic | ICD-10-CM

## 2023-02-22 DIAGNOSIS — Z20.828 CONTACT WITH AND (SUSPECTED) EXPOSURE TO OTHER VIRAL COMMUNICABLE DISEASES: ICD-10-CM

## 2023-02-22 LAB — SARS-COV-2 RNA SPEC QL NAA+PROBE: SIGNIFICANT CHANGE UP

## 2023-02-22 PROCEDURE — U0003: CPT

## 2023-02-22 PROCEDURE — U0005: CPT

## 2023-02-23 RX ORDER — ATORVASTATIN CALCIUM 80 MG/1
1 TABLET, FILM COATED ORAL
Qty: 0 | Refills: 0 | DISCHARGE

## 2023-02-24 ENCOUNTER — APPOINTMENT (OUTPATIENT)
Dept: ORTHOPEDIC SURGERY | Facility: CLINIC | Age: 53
End: 2023-02-24
Payer: COMMERCIAL

## 2023-02-24 ENCOUNTER — APPOINTMENT (OUTPATIENT)
Dept: ORTHOPEDIC SURGERY | Facility: HOSPITAL | Age: 53
End: 2023-02-24
Payer: COMMERCIAL

## 2023-02-24 ENCOUNTER — OUTPATIENT (OUTPATIENT)
Dept: OUTPATIENT SERVICES | Facility: HOSPITAL | Age: 53
LOS: 1 days | End: 2023-02-24
Payer: COMMERCIAL

## 2023-02-24 VITALS
TEMPERATURE: 98 F | HEART RATE: 64 BPM | DIASTOLIC BLOOD PRESSURE: 85 MMHG | OXYGEN SATURATION: 96 % | WEIGHT: 220.02 LBS | HEIGHT: 68 IN | SYSTOLIC BLOOD PRESSURE: 127 MMHG | RESPIRATION RATE: 17 BRPM

## 2023-02-24 VITALS
OXYGEN SATURATION: 98 % | RESPIRATION RATE: 22 BRPM | HEART RATE: 60 BPM | DIASTOLIC BLOOD PRESSURE: 85 MMHG | SYSTOLIC BLOOD PRESSURE: 145 MMHG

## 2023-02-24 VITALS — BODY MASS INDEX: 33.34 KG/M2 | HEIGHT: 68 IN | WEIGHT: 220 LBS

## 2023-02-24 DIAGNOSIS — Z98.890 OTHER SPECIFIED POSTPROCEDURAL STATES: Chronic | ICD-10-CM

## 2023-02-24 DIAGNOSIS — S83.241D OTHER TEAR OF MEDIAL MENISCUS, CURRENT INJURY, RIGHT KNEE, SUBSEQUENT ENCOUNTER: ICD-10-CM

## 2023-02-24 DIAGNOSIS — M67.00 SHORT ACHILLES TENDON (ACQUIRED), UNSPECIFIED ANKLE: Chronic | ICD-10-CM

## 2023-02-24 DIAGNOSIS — M17.12 UNILATERAL PRIMARY OSTEOARTHRITIS, LEFT KNEE: ICD-10-CM

## 2023-02-24 DIAGNOSIS — M54.16 RADICULOPATHY, LUMBAR REGION: ICD-10-CM

## 2023-02-24 PROCEDURE — 62323 NJX INTERLAMINAR LMBR/SAC: CPT

## 2023-02-24 PROCEDURE — 20610 DRAIN/INJ JOINT/BURSA W/O US: CPT | Mod: 50

## 2023-02-24 PROCEDURE — 99214 OFFICE O/P EST MOD 30 MIN: CPT | Mod: 25

## 2023-02-24 NOTE — PROCEDURE
[Large Joint Injection] : Large joint injection [Bilateral] : bilaterally of the [Knee] : knee [Pain] : pain [X-ray evidence of Osteoarthritis on this or prior visit] : x-ray evidence of Osteoarthritis on this or prior visit [Betadine] : betadine [Ethyl Chloride sprayed topically] : ethyl chloride sprayed topically [Euflexxa(20mg)] : 20mg of Euflexxa [#3] : series #3 [] : Patient tolerated procedure well [Call if redness, pain or fever occur] : call if redness, pain or fever occur [Apply ice for 15min out of every hour for the next 12-24 hours as tolerated] : apply ice for 15 minutes out of every hour for the next 12-24 hours as tolerated [Risks, benefits, alternatives discussed / Verbal consent obtained] : the risks benefits, and alternatives have been discussed, and verbal consent was obtained

## 2023-02-24 NOTE — HISTORY OF PRESENT ILLNESS
[7] : 7 [5] : 5 [Dull/Aching] : dull/aching [Intermittent] : intermittent [Euflexxa] : Euflexxa [] : no [FreeTextEntry1] : bilateral knee s [de-identified] : none [de-identified] : 2/14/23 [de-identified] : bilateral knees [de-identified] : none [TWNoteComboBox1] : 0%

## 2023-02-24 NOTE — DISCUSSION/SUMMARY
[de-identified] : "Written by Oriana Bear, acting as Scribe for Keenan Aguilar MD."\par \par Dr. Aguilar - \par The documentation recorded by the scribe accurately reflects the service I personally performed and the decisions made by me.

## 2023-02-24 NOTE — PHYSICAL EXAM
[Normal Mood and Affect] : normal mood and affect [Orientated] : orientated [Able to Communicate] : able to communicate [Well Developed] : well developed [Well Nourished] : well nourished [Right] : right knee [Negative] : negative anterior draw [NL (0)] : extension 0 degrees [5___] : hamstring 5[unfilled]/5 [Left] : left knee [AP] : anteroposterior [Lateral] : lateral [[place]] : skyline [] : non-antalgic [FreeTextEntry9] : Small spur lateral tibial condyle. [TWNoteComboBox7] : flexion 120 degrees

## 2023-02-27 ENCOUNTER — APPOINTMENT (OUTPATIENT)
Dept: CARDIOLOGY | Facility: CLINIC | Age: 53
End: 2023-02-27
Payer: COMMERCIAL

## 2023-02-27 PROCEDURE — 93306 TTE W/DOPPLER COMPLETE: CPT

## 2023-08-14 ENCOUNTER — APPOINTMENT (OUTPATIENT)
Dept: CARDIOLOGY | Facility: CLINIC | Age: 53
End: 2023-08-14

## 2023-08-21 ENCOUNTER — APPOINTMENT (OUTPATIENT)
Dept: PAIN MANAGEMENT | Facility: CLINIC | Age: 53
End: 2023-08-21
Payer: COMMERCIAL

## 2023-08-21 PROCEDURE — 99213 OFFICE O/P EST LOW 20 MIN: CPT | Mod: 95

## 2023-08-21 NOTE — ASSESSMENT
[FreeTextEntry1] : Interim history he patient returns with pain that has been treated adequately in the past with epidural steroid injections.  The patient's complaints are consistent with radiculopathy. Patient's ADL's increase with prior LATRICE.   The last injection gave greater than 60% reduction of the pain but now there is a return of symptoms. The patient is requesting a subsequent epidural steroid injection to alleviate pain and improve functional ability and quality of life.  Patient is a candidate. Objective findings Since the last visit, there have been no new imaging studies. THe patient has no new symptoms except the return of the their pain complaints. Motor and sensory function is unchanged. Plan Prior to any intervention the patient will obtain a new MRI of the lumbar spine.  Its been more than 3 years since the last MRI.  Spoke to patient about epidural steroid injections. Explained risks, benefits and alternatives including but not limited to the risk of infection, bleeding, headache, syncopal episode, failure to resolve issues, allergic reaction, symptom recurrence, allergic reaction, nerve injury, and increased pain. The patient understands the risks. All questions were answered. The patient is willing to proceed.

## 2023-08-21 NOTE — HISTORY OF PRESENT ILLNESS
[Lower back] : lower back [6] : 6 [Burning] : burning [Diffuse] : diffuse [Shooting] : shooting [Stabbing] : stabbing [Constant] : constant [Rest] : rest [Ice] : ice [Heat] : heat [Massage] : massage [Injection therapy] : injection therapy [Bending forward] : bending forward [Full time] : Work status: full time [Home] : at home, [unfilled] , at the time of the visit. [Medical Office: ([facility])___] : at the medical office located in  [Verbal consent obtained from patient] : the patient, [unfilled] [] : Post Surgical Visit: no [de-identified] : 02/24/2023 [de-identified] : LS [de-identified] : [place]  [TWNoteComboBox1] : 70%

## 2023-09-08 ENCOUNTER — RESULT REVIEW (OUTPATIENT)
Age: 53
End: 2023-09-08

## 2023-09-26 ENCOUNTER — TRANSCRIPTION ENCOUNTER (OUTPATIENT)
Age: 53
End: 2023-09-26

## 2023-09-26 NOTE — ASU DISCHARGE PLAN (ADULT/PEDIATRIC) - NS MD DC FALL RISK RISK
For information on Fall & Injury Prevention, visit: [email or web address]/[email or web address]-[email or web address]-[email or web address]-mobility OR  [email or web address]://www.[facility]/news/fall-prevention-tips-to-avoid-injury OR  https://www.cdc.gov/steadi/patient.html

## 2023-09-26 NOTE — ASU PATIENT PROFILE, ADULT - NS PRO ABUSE SCREEN SUSPICION NEGLECT YN
----- Message from Tegan Rios MD sent at 8/10/2017  3:09 PM CDT -----  B12 folic acid levels are normal, cholesterol is acceptable, liver profile is normal   no

## 2023-09-26 NOTE — ASU PATIENT PROFILE, ADULT - FALL HARM RISK - UNIVERSAL INTERVENTIONS
Bed in lowest position, wheels locked, appropriate side rails in place/Call bell, personal items and telephone in reach/Instruct patient to call for assistance before getting out of bed or chair/Non-slip footwear when patient is out of bed/[place] to call system/Purposeful Proactive Rounding/Room/bathroom lighting operational, light cord in reach

## 2023-09-28 ENCOUNTER — OUTPATIENT (OUTPATIENT)
Dept: OUTPATIENT SERVICES | Facility: HOSPITAL | Age: 53
LOS: 1 days | End: 2023-09-28
Payer: COMMERCIAL

## 2023-09-28 ENCOUNTER — APPOINTMENT (OUTPATIENT)
Dept: ORTHOPEDIC SURGERY | Facility: HOSPITAL | Age: 53
End: 2023-09-28
Payer: COMMERCIAL

## 2023-09-28 VITALS
SYSTOLIC BLOOD PRESSURE: 123 MMHG | DIASTOLIC BLOOD PRESSURE: 77 MMHG | HEART RATE: 55 BPM | RESPIRATION RATE: 15 BRPM | OXYGEN SATURATION: 98 %

## 2023-09-28 VITALS
RESPIRATION RATE: 14 BRPM | TEMPERATURE: 98 F | SYSTOLIC BLOOD PRESSURE: 134 MMHG | OXYGEN SATURATION: 98 % | WEIGHT: 214.95 LBS | DIASTOLIC BLOOD PRESSURE: 84 MMHG | HEIGHT: 68 IN | HEART RATE: 58 BPM

## 2023-09-28 DIAGNOSIS — M54.16 RADICULOPATHY, LUMBAR REGION: ICD-10-CM

## 2023-09-28 DIAGNOSIS — M67.00 SHORT ACHILLES TENDON (ACQUIRED), UNSPECIFIED ANKLE: Chronic | ICD-10-CM

## 2023-09-28 DIAGNOSIS — Z98.890 OTHER SPECIFIED POSTPROCEDURAL STATES: Chronic | ICD-10-CM

## 2023-09-28 PROCEDURE — 62323 NJX INTERLAMINAR LMBR/SAC: CPT

## 2024-01-08 ENCOUNTER — APPOINTMENT (OUTPATIENT)
Dept: PAIN MANAGEMENT | Facility: CLINIC | Age: 54
End: 2024-01-08
Payer: COMMERCIAL

## 2024-01-08 DIAGNOSIS — M54.16 RADICULOPATHY, LUMBAR REGION: ICD-10-CM

## 2024-01-08 PROCEDURE — 99213 OFFICE O/P EST LOW 20 MIN: CPT | Mod: 95

## 2024-01-08 NOTE — HISTORY OF PRESENT ILLNESS
[Lower back] : lower back [8] : 8 [6] : 6 [Burning] : burning [Diffuse] : diffuse [Shooting] : shooting [Stabbing] : stabbing [Constant] : constant [Rest] : rest [Ice] : ice [Heat] : heat [Massage] : massage [Injection therapy] : injection therapy [Bending forward] : bending forward [Full time] : Work status: full time [Home] : at home, [unfilled] , at the time of the visit. [Medical Office: ([facility])___] : at the medical office located in  [Verbal consent obtained from patient] : the patient, [unfilled] [FreeTextEntry1] : RAMEZ GONZALES IS FOLLOWING UP WITH RETURNING PAIN AFTER LATRICE ON 09/28/2023 PAIN RETUNED 5 DAY AFTER FROM LATRICE  [] : Post Surgical Visit: no [de-identified] : 02/24/2023 [de-identified] : LS [de-identified] : [place]  [TWNoteComboBox1] : 70%

## 2024-01-08 NOTE — ASSESSMENT
[FreeTextEntry1] : Interim history The patient returns with pain that has been treated adequately in the past with epidural steroid injections.  The patient's complaints are consistent with radiculopathy. Patient's ADL's increase with prior LATRICE.   The last injection gave greater than 60% reduction of the pain but now there is a return of symptoms. The patient is requesting a subsequent epidural steroid injection to alleviate pain and improve functional ability and quality of life.  Patient is a candidate. Objective findings Since the last visit, there have been no new imaging studies. The patient has no new symptoms except the return of the their pain complaints. Motor and sensory function is unchanged. Plan Spoke to patient about epidural steroid injections. Explained risks, benefits and alternatives including but not limited to the risk of infection, bleeding, headache, syncopal episode, failure to resolve issues, allergic reaction, symptom recurrence, allergic reaction, nerve injury, and increased pain. The patient understands the risks. All questions were answered. The patient is willing to proceed.

## 2024-01-12 ENCOUNTER — TRANSCRIPTION ENCOUNTER (OUTPATIENT)
Age: 54
End: 2024-01-12

## 2024-01-12 NOTE — ASU DISCHARGE PLAN (ADULT/PEDIATRIC) - NS MD DC FALL RISK RISK
For information on Fall & Injury Prevention, visit: https://www.[facility]/news/fall-prevention-protects-and-maintains-health-and-mobility OR  https://www.[facility]/news/fall-prevention-tips-to-avoid-injury OR  https://www.cdc.gov/steadi/patient.html For information on Fall & Injury Prevention, visit: https://www.[facility]/news/fall-prevention-protects-and-maintains-health-and-mobility OR  https://www.[facility]/news/fall-prevention-tips-[email or web address]-injury OR  https://www.cdc.gov/steadi/patient.html For information on Fall & Injury Prevention, visit: https://www.[facility]/news/fall-prevention-protects-and-maintains-health-and-mobility OR  https://www.[facility]/news/fall-prevention-tips-to-avoid-injury OR  https://www.cdc.gov/steadi/patient.html For information on Fall & Injury Prevention, visit: https://www.[facility]/news/fall-prevention-protects-and-maintains-health-and-mobility OR  https://www.[facility]/news/fall-prevention-tips-to-avoid-injury OR  https://www.cdc.gov/steadi/patient.html

## 2024-01-12 NOTE — ASU PATIENT PROFILE, ADULT - FALL HARM RISK - UNIVERSAL INTERVENTIONS
Bed in lowest position, wheels locked, appropriate side rails in place/Call bell, personal items and telephone in reach/Instruct patient to call for assistance before getting out of bed or chair/Non-slip footwear when patient is out of bed/[place] to call system/Physically safe environment - no spills, clutter or unnecessary equipment/Purposeful Proactive Rounding/Room/bathroom lighting operational, light cord in reach Bed in lowest position, wheels locked, appropriate side rails in place/Call bell, personal items and telephone in reach/Instruct patient to call for assistance before getting out of bed or chair/Non-slip footwear when patient is out of bed/[place] to call system/Physically safe environment - no spills, clutter or unnecessary equipment/Purposeful Proactive Rounding/Room/bathroom lighting operational, light cord in reach Bed in lowest position, wheels locked, appropriate side rails in place/Call bell, personal items and telephone in reach/Instruct patient to call for assistance before getting out of bed or chair/Non-slip footwear when patient is out of bed/[place] to call system/Physically safe environment - no spills, clutter or unnecessary equipment/Purposeful Proactive Rounding/Room/bathroom lighting operational, light cord in reach Bed in lowest position, wheels locked, appropriate side rails in place/Call bell, personal items and telephone in reach/Instruct patient to call for assistance before getting out of bed or chair/Non-slip footwear when patient is out of bed/[place] to call system/Physically safe environment - no spills, clutter or unnecessary equipment/Purposeful Proactive Rounding/Room/bathroom lighting operational, light cord in reach

## 2024-01-16 ENCOUNTER — OUTPATIENT (OUTPATIENT)
Dept: OUTPATIENT SERVICES | Facility: HOSPITAL | Age: 54
LOS: 1 days | End: 2024-01-16
Payer: COMMERCIAL

## 2024-01-16 ENCOUNTER — APPOINTMENT (OUTPATIENT)
Dept: ORTHOPEDIC SURGERY | Facility: HOSPITAL | Age: 54
End: 2024-01-16
Payer: COMMERCIAL

## 2024-01-16 VITALS
RESPIRATION RATE: 18 BRPM | SYSTOLIC BLOOD PRESSURE: 136 MMHG | HEIGHT: 68 IN | OXYGEN SATURATION: 96 % | HEART RATE: 69 BPM | WEIGHT: 214.95 LBS | DIASTOLIC BLOOD PRESSURE: 94 MMHG | TEMPERATURE: 98 F

## 2024-01-16 VITALS
HEART RATE: 67 BPM | DIASTOLIC BLOOD PRESSURE: 69 MMHG | OXYGEN SATURATION: 99 % | SYSTOLIC BLOOD PRESSURE: 118 MMHG | RESPIRATION RATE: 19 BRPM

## 2024-01-16 DIAGNOSIS — Z98.890 OTHER SPECIFIED POSTPROCEDURAL STATES: Chronic | ICD-10-CM

## 2024-01-16 DIAGNOSIS — M54.16 RADICULOPATHY, LUMBAR REGION: ICD-10-CM

## 2024-01-16 DIAGNOSIS — M67.00 SHORT ACHILLES TENDON (ACQUIRED), UNSPECIFIED ANKLE: Chronic | ICD-10-CM

## 2024-01-16 PROCEDURE — 62323 NJX INTERLAMINAR LMBR/SAC: CPT

## 2024-01-16 RX ORDER — ALBUTEROL 90 UG/1
2 AEROSOL, METERED ORAL
Qty: 0 | Refills: 0 | DISCHARGE

## 2024-01-16 RX ORDER — ROSUVASTATIN CALCIUM 5 MG/1
1 TABLET ORAL
Qty: 0 | Refills: 0 | DISCHARGE

## 2024-01-16 RX ORDER — IBUPROFEN 200 MG
2 TABLET ORAL
Refills: 0 | DISCHARGE

## 2024-01-16 RX ORDER — EZETIMIBE 10 MG/1
5 TABLET ORAL
Qty: 0 | Refills: 0 | DISCHARGE

## 2024-01-16 RX ORDER — METOPROLOL TARTRATE 50 MG
1 TABLET ORAL
Refills: 0 | DISCHARGE

## 2024-03-05 ENCOUNTER — APPOINTMENT (OUTPATIENT)
Dept: ORTHOPEDIC SURGERY | Facility: CLINIC | Age: 54
End: 2024-03-05
Payer: COMMERCIAL

## 2024-03-05 VITALS — WEIGHT: 215 LBS | BODY MASS INDEX: 33.74 KG/M2 | HEIGHT: 67 IN

## 2024-03-05 PROCEDURE — 99213 OFFICE O/P EST LOW 20 MIN: CPT

## 2024-03-05 RX ADMIN — Medication 2 MG/2ML: at 00:00

## 2024-03-05 NOTE — PLAN
[TextEntry] : The natural progression of osteoarthritis was explained to the patient.  We discussed the possible treatment options from conservative to operative.  These included NSAIDs, Glucosamine and Chondroitin sulfate, and physical therapy as well different types of injections.  We also discussed that at some point they may progress to need a TKA.  Information and pamphlets were given.  Requesting authorization for viscosupplementation.

## 2024-03-05 NOTE — PHYSICAL EXAM
[Normal Mood and Affect] : normal mood and affect [Oriented] : oriented [Able to Communicate] : able to communicate [Well Developed] : well developed [Well Nourished] : well nourished [Right] : right knee [Negative] : negative anterior draw [NL (0)] : extension 0 degrees [5___] : hamstring 5[unfilled]/5 [Left] : left knee [AP] : anteroposterior [Lateral] : lateral [[place]] : skyline [] : no lateral facet of patella tenderness [FreeTextEntry9] : Small spur lateral tibial condyle. [TWNoteComboBox7] : flexion 120 degrees

## 2024-03-05 NOTE — HISTORY OF PRESENT ILLNESS
[Intermittent] : intermittent [Euflexxa] : Euflexxa [5] : 5 [0] : 0 [Walking] : walking [FreeTextEntry1] : [place] [] : no [FreeTextEntry6] : pinching [de-identified] : bilateral knees [de-identified] : 2/14/23 [de-identified] : none [TWNoteComboBox1] : 0%

## 2024-03-07 ENCOUNTER — APPOINTMENT (OUTPATIENT)
Dept: ORTHOPEDIC SURGERY | Facility: CLINIC | Age: 54
End: 2024-03-07
Payer: COMMERCIAL

## 2024-03-07 VITALS — WEIGHT: 215 LBS | BODY MASS INDEX: 33.74 KG/M2 | HEIGHT: 67 IN

## 2024-03-07 PROCEDURE — 20610 DRAIN/INJ JOINT/BURSA W/O US: CPT | Mod: RT

## 2024-03-07 NOTE — HISTORY OF PRESENT ILLNESS
[5] : 5 [0] : 0 [Intermittent] : intermittent [Walking] : walking [1] : 1 [Euflexxa] : Euflexxa [] : no [FreeTextEntry1] : [place] [FreeTextEntry6] : pinching [de-identified] : bilateral knees [de-identified] : visco [de-identified] : none [TWNoteComboBox1] : 0%

## 2024-03-07 NOTE — DISCUSSION/SUMMARY
[de-identified] : "Written by Oriana Bear, acting as Scribe for Keenan Aguilar MD."  Dr. Aguilar -  The documentation recorded by the scribe accurately reflects the service I personally performed and the decisions made by me.

## 2024-03-07 NOTE — PROCEDURE
[Large Joint Injection] : Large joint injection [Right] : of the right [Knee] : knee [Pain] : pain [Inflammation] : inflammation [X-ray evidence of Osteoarthritis on this or prior visit] : x-ray evidence of Osteoarthritis on this or prior visit [Betadine] : betadine [Ethyl Chloride sprayed topically] : ethyl chloride sprayed topically [#1] : series #1 [Euflexxa(20mg)] : 20mg of Euflexxa [] : Patient tolerated procedure well [Call if redness, pain or fever occur] : call if redness, pain or fever occur [Apply ice for 15min out of every hour for the next 12-24 hours as tolerated] : apply ice for 15 minutes out of every hour for the next 12-24 hours as tolerated [Risks, benefits, alternatives discussed / Verbal consent obtained] : the risks benefits, and alternatives have been discussed, and verbal consent was obtained

## 2024-03-07 NOTE — PLAN
[TextEntry] : The natural progression of osteoarthritis was explained to the patient.  We discussed the possible treatment options from conservative to operative.  These included NSAIDs, Glucosamine and Chondroitin sulfate, and physical therapy as well different types of injections.  We also discussed that at some point they may progress to need a TKA.  Information and pamphlets were given.   Euflexxa injections started today, right knee.

## 2024-03-07 NOTE — PHYSICAL EXAM
[Normal Mood and Affect] : normal mood and affect [Oriented] : oriented [Able to Communicate] : able to communicate [Well Developed] : well developed [Well Nourished] : well nourished [Right] : right knee [Negative] : negative anterior draw [NL (0)] : extension 0 degrees [5___] : hamstring 5[unfilled]/5 [Left] : left knee [Lateral] : lateral [AP] : anteroposterior [[place]] : skyline [] : non-antalgic [FreeTextEntry9] : Small spur lateral tibial condyle. [TWNoteComboBox7] : flexion 120 degrees

## 2024-03-14 ENCOUNTER — APPOINTMENT (OUTPATIENT)
Dept: ORTHOPEDIC SURGERY | Facility: CLINIC | Age: 54
End: 2024-03-14
Payer: COMMERCIAL

## 2024-03-14 DIAGNOSIS — Z98.890 OTHER SPECIFIED POSTPROCEDURAL STATES: ICD-10-CM

## 2024-03-14 PROCEDURE — 20610 DRAIN/INJ JOINT/BURSA W/O US: CPT | Mod: RT

## 2024-03-14 NOTE — PHYSICAL EXAM
[Normal Mood and Affect] : normal mood and affect [Able to Communicate] : able to communicate [Oriented] : oriented [Well Developed] : well developed [Well Nourished] : well nourished [Right] : right knee [Negative] : negative anterior draw [NL (0)] : extension 0 degrees [5___] : hamstring 5[unfilled]/5 [Left] : left knee [AP] : anteroposterior [Lateral] : lateral [[place]] : skyline [] : non-antalgic [FreeTextEntry9] : Small spur lateral tibial condyle. [TWNoteComboBox7] : flexion 120 degrees

## 2024-03-14 NOTE — DISCUSSION/SUMMARY
[de-identified] : "Written by Oriana Bear, acting as Scribe for Keenan Aguilar MD."  Dr. Aguilar -  The documentation recorded by the scribe accurately reflects the service I personally performed and the decisions made by me.

## 2024-03-14 NOTE — PLAN
[TextEntry] : The natural progression of osteoarthritis was explained to the patient.  We discussed the possible treatment options from conservative to operative.  These included NSAIDs, Glucosamine and Chondroitin sulfate, and physical therapy as well different types of injections.  We also discussed that at some point they may progress to need a TKA.  Information and pamphlets were given.   Euflexxa injections #2 given today, right knee.

## 2024-03-14 NOTE — HISTORY OF PRESENT ILLNESS
[0] : 0 [Intermittent] : intermittent [Walking] : walking [1] : 1 [Euflexxa] : Euflexxa [4] : 4 [] : no [FreeTextEntry1] : [place] [FreeTextEntry6] : pinching [de-identified] : visco [de-identified] : bilateral knees [de-identified] : none [TWNoteComboBox1] : 0%

## 2024-03-14 NOTE — PROCEDURE
[Large Joint Injection] : Large joint injection [Right] : of the right [Knee] : knee [Pain] : pain [Inflammation] : inflammation [X-ray evidence of Osteoarthritis on this or prior visit] : x-ray evidence of Osteoarthritis on this or prior visit [Betadine] : betadine [Ethyl Chloride sprayed topically] : ethyl chloride sprayed topically [Euflexxa(20mg)] : 20mg of Euflexxa [#2] : series #2 [] : Patient tolerated procedure well [Apply ice for 15min out of every hour for the next 12-24 hours as tolerated] : apply ice for 15 minutes out of every hour for the next 12-24 hours as tolerated [Call if redness, pain or fever occur] : call if redness, pain or fever occur [Risks, benefits, alternatives discussed / Verbal consent obtained] : the risks benefits, and alternatives have been discussed, and verbal consent was obtained

## 2024-03-26 ENCOUNTER — APPOINTMENT (OUTPATIENT)
Dept: ORTHOPEDIC SURGERY | Facility: CLINIC | Age: 54
End: 2024-03-26
Payer: COMMERCIAL

## 2024-03-26 VITALS — WEIGHT: 215 LBS | HEIGHT: 67 IN | BODY MASS INDEX: 33.74 KG/M2

## 2024-03-26 DIAGNOSIS — M17.11 UNILATERAL PRIMARY OSTEOARTHRITIS, RIGHT KNEE: ICD-10-CM

## 2024-03-26 PROCEDURE — 20610 DRAIN/INJ JOINT/BURSA W/O US: CPT | Mod: RT

## 2024-03-26 RX ORDER — PIROXICAM 20 MG/1
20 CAPSULE ORAL
Qty: 30 | Refills: 4 | Status: ACTIVE | COMMUNITY
Start: 2024-03-26 | End: 1900-01-01

## 2024-03-26 RX ORDER — PIROXICAM 20 MG/1
20 CAPSULE ORAL
Qty: 30 | Refills: 4 | Status: ACTIVE | OUTPATIENT
Start: 2023-02-24

## 2024-03-26 NOTE — PLAN
[TextEntry] : The natural progression of osteoarthritis was explained to the patient.  We discussed the possible treatment options from conservative to operative.  These included NSAIDs, Glucosamine and Chondroitin sulfate, and physical therapy as well different types of injections.  We also discussed that at some point they may progress to need a TKA.  Information and pamphlets were given.   Euflexxa injections #3 given today, right knee.

## 2024-03-26 NOTE — PROCEDURE
[Large Joint Injection] : Large joint injection [Right] : of the right [Knee] : knee [Pain] : pain [Inflammation] : inflammation [X-ray evidence of Osteoarthritis on this or prior visit] : x-ray evidence of Osteoarthritis on this or prior visit [Ethyl Chloride sprayed topically] : ethyl chloride sprayed topically [Betadine] : betadine [Euflexxa(20mg)] : 20mg of Euflexxa [#3] : series #3 [Call if redness, pain or fever occur] : call if redness, pain or fever occur [] : Patient tolerated procedure well [Apply ice for 15min out of every hour for the next 12-24 hours as tolerated] : apply ice for 15 minutes out of every hour for the next 12-24 hours as tolerated [Risks, benefits, alternatives discussed / Verbal consent obtained] : the risks benefits, and alternatives have been discussed, and verbal consent was obtained

## 2024-03-26 NOTE — HISTORY OF PRESENT ILLNESS
[4] : 4 [Intermittent] : intermittent [0] : 0 [Walking] : walking [Euflexxa] : Euflexxa [3] : 3 [] : no [FreeTextEntry6] : pinching [FreeTextEntry1] : [place] [de-identified] : 3/14/24 [de-identified] : right knee [de-identified] : visco [de-identified] : none [TWNoteComboBox1] : 30%

## 2024-03-26 NOTE — PHYSICAL EXAM
[Oriented] : oriented [Normal Mood and Affect] : normal mood and affect [Able to Communicate] : able to communicate [Well Developed] : well developed [Well Nourished] : well nourished [Right] : right knee [Negative] : negative anterior draw [NL (0)] : extension 0 degrees [5___] : hamstring 5[unfilled]/5 [Left] : left knee [AP] : anteroposterior [Lateral] : lateral [[place]] : skyline [] : negative [place] [FreeTextEntry9] : Small spur lateral tibial condyle. [TWNoteComboBox7] : flexion 120 degrees

## 2024-03-26 NOTE — DISCUSSION/SUMMARY
[de-identified] : "Written by Oriana Bear, acting as Scribe for Keenan Aguilar MD."  Dr. Aguilar -  The documentation recorded by the scribe accurately reflects the service I personally performed and the decisions made by me.

## 2024-04-21 ENCOUNTER — RX RENEWAL (OUTPATIENT)
Age: 54
End: 2024-04-21

## 2024-04-21 RX ORDER — RIVAROXABAN 20 MG/1
20 TABLET, FILM COATED ORAL
Qty: 30 | Refills: 2 | Status: ACTIVE | COMMUNITY
Start: 2019-06-04 | End: 1900-01-01

## 2024-08-29 DIAGNOSIS — R94.31 ABNORMAL ELECTROCARDIOGRAM [ECG] [EKG]: ICD-10-CM

## 2024-08-30 ENCOUNTER — APPOINTMENT (OUTPATIENT)
Dept: ORTHOPEDIC SURGERY | Facility: CLINIC | Age: 54
End: 2024-08-30

## 2024-08-30 VITALS — WEIGHT: 220 LBS | BODY MASS INDEX: 33.34 KG/M2 | HEIGHT: 68 IN

## 2024-08-30 DIAGNOSIS — M17.11 UNILATERAL PRIMARY OSTEOARTHRITIS, RIGHT KNEE: ICD-10-CM

## 2024-08-30 DIAGNOSIS — Z98.890 OTHER SPECIFIED POSTPROCEDURAL STATES: ICD-10-CM

## 2024-08-30 PROCEDURE — 20610 DRAIN/INJ JOINT/BURSA W/O US: CPT | Mod: RT

## 2024-08-30 PROCEDURE — 99212 OFFICE O/P EST SF 10 MIN: CPT | Mod: 25

## 2024-08-30 PROCEDURE — 73562 X-RAY EXAM OF KNEE 3: CPT | Mod: RT

## 2024-08-30 NOTE — HISTORY OF PRESENT ILLNESS
[0] : 0 [Intermittent] : intermittent [Walking] : walking [3] : 3 [Euflexxa] : Euflexxa [Sudden] : sudden [8] : 8 [Dull/Aching] : dull/aching [Throbbing] : throbbing [Full time] : Work status: full time [] : no [FreeTextEntry1] : [place] [FreeTextEntry6] : pinching [FreeTextEntry9] : Advil [de-identified] : getting up [de-identified] : 3/26/4 [de-identified] : Dr grimes [de-identified] : [facility] [de-identified] :  [de-identified] : right knee [de-identified] : 3/14/24 [de-identified] : visco [de-identified] : none [TWNoteComboBox1] : 30%

## 2024-08-30 NOTE — PHYSICAL EXAM
[Normal Mood and Affect] : normal mood and affect [Oriented] : oriented [Able to Communicate] : able to communicate [Well Developed] : well developed [Well Nourished] : well nourished [Right] : right knee [Negative] : negative anterior draw [NL (0)] : extension 0 degrees [5___] : hamstring 5[unfilled]/5 [Left] : left knee [AP] : anteroposterior [Lateral] : lateral [[place]] : skyline [] : non-antalgic [FreeTextEntry9] : Small spur lateral tibial condyle. [TWNoteComboBox7] : flexion 120 degrees

## 2024-09-17 ENCOUNTER — RX RENEWAL (OUTPATIENT)
Age: 54
End: 2024-09-17

## 2024-10-08 ENCOUNTER — APPOINTMENT (OUTPATIENT)
Dept: ORTHOPEDIC SURGERY | Facility: CLINIC | Age: 54
End: 2024-10-08
Payer: COMMERCIAL

## 2024-10-08 PROCEDURE — 20610 DRAIN/INJ JOINT/BURSA W/O US: CPT | Mod: RT

## 2024-10-15 ENCOUNTER — APPOINTMENT (OUTPATIENT)
Dept: ORTHOPEDIC SURGERY | Facility: CLINIC | Age: 54
End: 2024-10-15
Payer: COMMERCIAL

## 2024-10-15 VITALS — BODY MASS INDEX: 33.34 KG/M2 | HEIGHT: 68 IN | WEIGHT: 220 LBS

## 2024-10-15 PROCEDURE — 20610 DRAIN/INJ JOINT/BURSA W/O US: CPT | Mod: RT

## 2024-10-22 ENCOUNTER — APPOINTMENT (OUTPATIENT)
Dept: ORTHOPEDIC SURGERY | Facility: CLINIC | Age: 54
End: 2024-10-22
Payer: COMMERCIAL

## 2024-10-22 VITALS — HEIGHT: 68 IN | BODY MASS INDEX: 33.34 KG/M2 | WEIGHT: 220 LBS

## 2024-10-22 DIAGNOSIS — M17.11 UNILATERAL PRIMARY OSTEOARTHRITIS, RIGHT KNEE: ICD-10-CM

## 2024-10-22 PROCEDURE — 20610 DRAIN/INJ JOINT/BURSA W/O US: CPT | Mod: RT

## 2024-11-20 ENCOUNTER — RX RENEWAL (OUTPATIENT)
Age: 54
End: 2024-11-20

## 2025-04-01 ENCOUNTER — APPOINTMENT (OUTPATIENT)
Dept: ORTHOPEDIC SURGERY | Facility: CLINIC | Age: 55
End: 2025-04-01

## 2025-04-01 VITALS — BODY MASS INDEX: 33.34 KG/M2 | WEIGHT: 220 LBS | HEIGHT: 68 IN

## 2025-04-01 DIAGNOSIS — M17.11 UNILATERAL PRIMARY OSTEOARTHRITIS, RIGHT KNEE: ICD-10-CM

## 2025-04-01 PROCEDURE — 20610 DRAIN/INJ JOINT/BURSA W/O US: CPT | Mod: RT

## 2025-04-07 DIAGNOSIS — R94.31 ABNORMAL ELECTROCARDIOGRAM [ECG] [EKG]: ICD-10-CM

## 2025-04-07 DIAGNOSIS — R00.2 PALPITATIONS: ICD-10-CM

## 2025-04-07 DIAGNOSIS — I48.0 PAROXYSMAL ATRIAL FIBRILLATION: ICD-10-CM

## 2025-04-08 ENCOUNTER — APPOINTMENT (OUTPATIENT)
Dept: PAIN MANAGEMENT | Facility: CLINIC | Age: 55
End: 2025-04-08

## 2025-04-08 DIAGNOSIS — M54.16 RADICULOPATHY, LUMBAR REGION: ICD-10-CM

## 2025-04-08 PROCEDURE — 99213 OFFICE O/P EST LOW 20 MIN: CPT

## 2025-04-23 ENCOUNTER — TRANSCRIPTION ENCOUNTER (OUTPATIENT)
Age: 55
End: 2025-04-23

## 2025-04-23 NOTE — ASU DISCHARGE PLAN (ADULT/PEDIATRIC) - FINANCIAL ASSISTANCE
[facility] provides services at a reduced cost to those who are determined to be eligible through [facility]’s financial assistance program. Information regarding [facility]’s financial assistance program can be found by going to [email or web address] or by calling [phone].

## 2025-04-23 NOTE — ASU PATIENT PROFILE, ADULT - PRO ARRIVE FROM
NURSE NOTES:

Received report from  Obed LEDESMA. Pt alert and orientedx4 and able to able needs known. Pt 
sitting in bed.  Bed in lowest position and locked. Call light within easy reach. Side 
railsx2 up for safety. No c/o pain. Denied SOB. IV site in RAC 18G SL patent and 
asymptomatic. No episode of emesis or hematemesis noted. Will continue to plan of care. home

## 2025-04-23 NOTE — ASU PATIENT PROFILE, ADULT - FALL HARM RISK - UNIVERSAL INTERVENTIONS
Bed in lowest position, wheels locked, appropriate side rails in place/Call bell, personal items and telephone in reach/Instruct patient to call for assistance before getting out of bed or chair/Non-slip footwear when patient is out of bed/[place] to call system/Physically safe environment - no spills, clutter or unnecessary equipment/Purposeful Proactive Rounding/Room/bathroom lighting operational, light cord in reach

## 2025-04-25 ENCOUNTER — APPOINTMENT (OUTPATIENT)
Dept: ORTHOPEDIC SURGERY | Facility: CLINIC | Age: 55
End: 2025-04-25

## 2025-04-25 VITALS — WEIGHT: 220 LBS | HEIGHT: 68 IN | BODY MASS INDEX: 33.34 KG/M2

## 2025-04-25 DIAGNOSIS — M17.11 UNILATERAL PRIMARY OSTEOARTHRITIS, RIGHT KNEE: ICD-10-CM

## 2025-04-25 PROCEDURE — 20610 DRAIN/INJ JOINT/BURSA W/O US: CPT | Mod: RT

## 2025-05-01 ENCOUNTER — APPOINTMENT (OUTPATIENT)
Dept: ORTHOPEDIC SURGERY | Facility: HOSPITAL | Age: 55
End: 2025-05-01
Payer: COMMERCIAL

## 2025-05-01 ENCOUNTER — OUTPATIENT (OUTPATIENT)
Dept: OUTPATIENT SERVICES | Facility: HOSPITAL | Age: 55
LOS: 1 days | End: 2025-05-01
Payer: COMMERCIAL

## 2025-05-01 VITALS
OXYGEN SATURATION: 96 % | DIASTOLIC BLOOD PRESSURE: 83 MMHG | SYSTOLIC BLOOD PRESSURE: 122 MMHG | HEART RATE: 56 BPM | TEMPERATURE: 98 F | WEIGHT: 220.02 LBS | RESPIRATION RATE: 14 BRPM | HEIGHT: 67 IN

## 2025-05-01 VITALS
HEART RATE: 63 BPM | OXYGEN SATURATION: 100 % | DIASTOLIC BLOOD PRESSURE: 77 MMHG | SYSTOLIC BLOOD PRESSURE: 120 MMHG | RESPIRATION RATE: 19 BRPM

## 2025-05-01 DIAGNOSIS — M54.16 RADICULOPATHY, LUMBAR REGION: ICD-10-CM

## 2025-05-01 DIAGNOSIS — Z98.890 OTHER SPECIFIED POSTPROCEDURAL STATES: Chronic | ICD-10-CM

## 2025-05-01 DIAGNOSIS — M67.00 SHORT ACHILLES TENDON (ACQUIRED), UNSPECIFIED ANKLE: Chronic | ICD-10-CM

## 2025-05-01 PROCEDURE — 62323 NJX INTERLAMINAR LMBR/SAC: CPT

## 2025-05-05 ENCOUNTER — APPOINTMENT (OUTPATIENT)
Dept: ORTHOPEDIC SURGERY | Facility: CLINIC | Age: 55
End: 2025-05-05

## 2025-05-05 VITALS — HEIGHT: 68 IN | BODY MASS INDEX: 33.34 KG/M2 | WEIGHT: 220 LBS

## 2025-05-05 PROCEDURE — 20610 DRAIN/INJ JOINT/BURSA W/O US: CPT | Mod: RT

## 2025-05-13 ENCOUNTER — APPOINTMENT (OUTPATIENT)
Dept: ORTHOPEDIC SURGERY | Facility: CLINIC | Age: 55
End: 2025-05-13

## 2025-05-13 VITALS — HEIGHT: 68 IN | BODY MASS INDEX: 33.34 KG/M2 | WEIGHT: 220 LBS

## 2025-05-13 DIAGNOSIS — M17.11 UNILATERAL PRIMARY OSTEOARTHRITIS, RIGHT KNEE: ICD-10-CM

## 2025-05-13 PROCEDURE — 20610 DRAIN/INJ JOINT/BURSA W/O US: CPT | Mod: RT

## 2025-05-13 RX ORDER — PIROXICAM 20 MG/1
20 CAPSULE ORAL
Qty: 30 | Refills: 4 | Status: ACTIVE | COMMUNITY
Start: 2025-05-13 | End: 1900-01-01

## 2025-05-19 ENCOUNTER — APPOINTMENT (OUTPATIENT)
Dept: CARDIOLOGY | Facility: CLINIC | Age: 55
End: 2025-05-19
Payer: COMMERCIAL

## 2025-05-19 ENCOUNTER — NON-APPOINTMENT (OUTPATIENT)
Age: 55
End: 2025-05-19

## 2025-05-19 VITALS
OXYGEN SATURATION: 94 % | BODY MASS INDEX: 33.34 KG/M2 | WEIGHT: 220 LBS | RESPIRATION RATE: 19 BRPM | SYSTOLIC BLOOD PRESSURE: 131 MMHG | HEIGHT: 68 IN | HEART RATE: 64 BPM | DIASTOLIC BLOOD PRESSURE: 84 MMHG

## 2025-05-19 DIAGNOSIS — I25.10 ATHEROSCLEROTIC HEART DISEASE OF NATIVE CORONARY ARTERY W/OUT ANGINA PECTORIS: ICD-10-CM

## 2025-05-19 DIAGNOSIS — R93.1 ABNORMAL FINDINGS ON DIAGNOSTIC IMAGING OF HEART AND CORONARY CIRCULATION: ICD-10-CM

## 2025-05-19 DIAGNOSIS — R06.09 OTHER FORMS OF DYSPNEA: ICD-10-CM

## 2025-05-19 DIAGNOSIS — R94.31 ABNORMAL ELECTROCARDIOGRAM [ECG] [EKG]: ICD-10-CM

## 2025-05-19 PROCEDURE — 99244 OFF/OP CNSLTJ NEW/EST MOD 40: CPT

## 2025-05-19 PROCEDURE — 93306 TTE W/DOPPLER COMPLETE: CPT

## 2025-05-19 PROCEDURE — 93000 ELECTROCARDIOGRAM COMPLETE: CPT

## 2025-06-02 ENCOUNTER — RX RENEWAL (OUTPATIENT)
Age: 55
End: 2025-06-02

## 2025-06-26 ENCOUNTER — APPOINTMENT (OUTPATIENT)
Dept: ORTHOPEDIC SURGERY | Facility: CLINIC | Age: 55
End: 2025-06-26

## 2025-06-26 PROCEDURE — 20610 DRAIN/INJ JOINT/BURSA W/O US: CPT | Mod: RT

## 2025-08-20 DIAGNOSIS — R94.31 ABNORMAL ELECTROCARDIOGRAM [ECG] [EKG]: ICD-10-CM

## 2025-08-20 DIAGNOSIS — R06.09 OTHER FORMS OF DYSPNEA: ICD-10-CM

## 2025-08-20 DIAGNOSIS — I48.0 PAROXYSMAL ATRIAL FIBRILLATION: ICD-10-CM

## 2025-08-27 ENCOUNTER — APPOINTMENT (OUTPATIENT)
Dept: CT IMAGING | Facility: CLINIC | Age: 55
End: 2025-08-27
Payer: COMMERCIAL

## 2025-08-27 ENCOUNTER — OUTPATIENT (OUTPATIENT)
Dept: OUTPATIENT SERVICES | Facility: HOSPITAL | Age: 55
LOS: 1 days | End: 2025-08-27
Payer: COMMERCIAL

## 2025-08-27 DIAGNOSIS — I48.0 PAROXYSMAL ATRIAL FIBRILLATION: ICD-10-CM

## 2025-08-27 DIAGNOSIS — R94.31 ABNORMAL ELECTROCARDIOGRAM [ECG] [EKG]: ICD-10-CM

## 2025-08-27 DIAGNOSIS — R93.1 ABNORMAL FINDINGS ON DIAGNOSTIC IMAGING OF HEART AND CORONARY CIRCULATION: ICD-10-CM

## 2025-08-27 DIAGNOSIS — M67.00 SHORT ACHILLES TENDON (ACQUIRED), UNSPECIFIED ANKLE: Chronic | ICD-10-CM

## 2025-08-27 DIAGNOSIS — Z98.890 OTHER SPECIFIED POSTPROCEDURAL STATES: Chronic | ICD-10-CM

## 2025-08-27 PROCEDURE — 75574 CT ANGIO HRT W/3D IMAGE: CPT

## 2025-08-27 PROCEDURE — 75574 CT ANGIO HRT W/3D IMAGE: CPT | Mod: 26

## 2025-09-11 ENCOUNTER — APPOINTMENT (OUTPATIENT)
Dept: ORTHOPEDIC SURGERY | Facility: CLINIC | Age: 55
End: 2025-09-11
Payer: COMMERCIAL

## 2025-09-11 DIAGNOSIS — Z98.890 OTHER SPECIFIED POSTPROCEDURAL STATES: ICD-10-CM

## 2025-09-11 DIAGNOSIS — M17.11 UNILATERAL PRIMARY OSTEOARTHRITIS, RIGHT KNEE: ICD-10-CM

## 2025-09-11 DIAGNOSIS — S83.241D OTHER TEAR OF MEDIAL MENISCUS, CURRENT INJURY, RIGHT KNEE, SUBSEQUENT ENCOUNTER: ICD-10-CM

## 2025-09-11 PROCEDURE — 20610 DRAIN/INJ JOINT/BURSA W/O US: CPT | Mod: RT

## (undated) DEVICE — GLV 8 ULTRAFREE MAX

## (undated) DEVICE — PACK IV START WITH CHG

## (undated) DEVICE — GLV 7.5 ULTRAFREE MAX

## (undated) DEVICE — NDL SPINAL 22G X 3.5" QUINCKE

## (undated) DEVICE — TRAY EPIDURAL SINGLE DOSE

## (undated) DEVICE — CATH IV SAFE BC BLU 22GAX1.0"

## (undated) DEVICE — CATH IV SAFE 24GX3/4

## (undated) DEVICE — STYLET  ENDOTRACH 7.5MM X 10MM

## (undated) DEVICE — NDL SPNL WHIT 22GX3.5IN

## (undated) DEVICE — SOL INJ LR 500ML

## (undated) DEVICE — GLV 8.5 PROTEXIS (WHITE)